# Patient Record
Sex: MALE | Race: BLACK OR AFRICAN AMERICAN | NOT HISPANIC OR LATINO | Employment: FULL TIME | URBAN - METROPOLITAN AREA
[De-identification: names, ages, dates, MRNs, and addresses within clinical notes are randomized per-mention and may not be internally consistent; named-entity substitution may affect disease eponyms.]

---

## 2017-12-01 ENCOUNTER — OFFICE VISIT (OUTPATIENT)
Dept: FAMILY MEDICINE | Facility: CLINIC | Age: 62
End: 2017-12-01

## 2017-12-01 VITALS
WEIGHT: 183.8 LBS | OXYGEN SATURATION: 96 % | HEART RATE: 63 BPM | SYSTOLIC BLOOD PRESSURE: 120 MMHG | HEIGHT: 68 IN | DIASTOLIC BLOOD PRESSURE: 74 MMHG | BODY MASS INDEX: 27.86 KG/M2 | TEMPERATURE: 96.9 F

## 2017-12-01 DIAGNOSIS — R97.20 ELEVATED PROSTATE SPECIFIC ANTIGEN (PSA): ICD-10-CM

## 2017-12-01 DIAGNOSIS — Z00.00 ENCOUNTER FOR ROUTINE ADULT HEALTH EXAMINATION WITHOUT ABNORMAL FINDINGS: Primary | ICD-10-CM

## 2017-12-01 DIAGNOSIS — Z13.6 CARDIOVASCULAR SCREENING; LDL GOAL LESS THAN 160: ICD-10-CM

## 2017-12-01 DIAGNOSIS — Z11.59 NEED FOR HEPATITIS C SCREENING TEST: ICD-10-CM

## 2017-12-01 DIAGNOSIS — H93.13 TINNITUS, BILATERAL: ICD-10-CM

## 2017-12-01 DIAGNOSIS — Z12.5 SCREENING FOR PROSTATE CANCER: ICD-10-CM

## 2017-12-01 DIAGNOSIS — Z12.11 SCREEN FOR COLON CANCER: ICD-10-CM

## 2017-12-01 DIAGNOSIS — I10 ESSENTIAL HYPERTENSION WITH GOAL BLOOD PRESSURE LESS THAN 140/90: ICD-10-CM

## 2017-12-01 PROBLEM — E66.3 OVERWEIGHT (BMI 25.0-29.9): Status: ACTIVE | Noted: 2017-12-01

## 2017-12-01 LAB
CHOLEST SERPL-MCNC: 178 MG/DL
CREAT SERPL-MCNC: 0.95 MG/DL (ref 0.66–1.25)
GFR SERPL CREATININE-BSD FRML MDRD: 80 ML/MIN/1.7M2
HDLC SERPL-MCNC: 46 MG/DL
LDLC SERPL CALC-MCNC: 110 MG/DL
NONHDLC SERPL-MCNC: 132 MG/DL
PSA SERPL-ACNC: 6.99 UG/L (ref 0–4)
TRIGL SERPL-MCNC: 111 MG/DL
TSH SERPL DL<=0.005 MIU/L-ACNC: 0.82 MU/L (ref 0.4–4)

## 2017-12-01 PROCEDURE — 84443 ASSAY THYROID STIM HORMONE: CPT | Performed by: FAMILY MEDICINE

## 2017-12-01 PROCEDURE — 36415 COLL VENOUS BLD VENIPUNCTURE: CPT | Performed by: FAMILY MEDICINE

## 2017-12-01 PROCEDURE — G0103 PSA SCREENING: HCPCS | Performed by: FAMILY MEDICINE

## 2017-12-01 PROCEDURE — 99213 OFFICE O/P EST LOW 20 MIN: CPT | Mod: 25 | Performed by: FAMILY MEDICINE

## 2017-12-01 PROCEDURE — 99386 PREV VISIT NEW AGE 40-64: CPT | Performed by: FAMILY MEDICINE

## 2017-12-01 PROCEDURE — 86803 HEPATITIS C AB TEST: CPT | Performed by: FAMILY MEDICINE

## 2017-12-01 PROCEDURE — 82565 ASSAY OF CREATININE: CPT | Performed by: FAMILY MEDICINE

## 2017-12-01 PROCEDURE — 80061 LIPID PANEL: CPT | Performed by: FAMILY MEDICINE

## 2017-12-01 RX ORDER — AMLODIPINE BESYLATE 5 MG/1
5 TABLET ORAL DAILY
COMMUNITY
Start: 2017-11-28 | End: 2024-03-20

## 2017-12-01 NOTE — PROGRESS NOTES
SUBJECTIVE:   CC: Jose Alejandro Eason is an 62 year old male who presents for preventative health visit.   He is accompanied by his daughter.     He used to doctor in [place]. Daughter reports he has not had a physical in awhile and has been having issues with his blood pressure lately. He saw a provider in [place] 3 days ago for high blood pressure and is only taking amlodipine 5 mg (he was told not to take the irbesartan by this provider).    Healthy Habits:    Do you get at least three servings of calcium containing foods daily (dairy, green leafy vegetables, etc.)? no    Amount of exercise or daily activities, outside of work: None.    Problems taking medications regularly No    Medication side effects: No    Have you had an eye exam in the past two years? yes    Do you see a dentist twice per year? no    Do you have sleep apnea, excessive snoring or daytime drowsiness?excessive snoring        Today's PHQ-2 Score: PHQ-2 ( 1999 [employer]) 12/1/2017   Q1: Little interest or pleasure in doing things 0   Q2: Feeling down, depressed or hopeless 0   PHQ-2 Score 0         Abuse: Current or Past(Physical, Sexual or Emotional)- No  Do you feel safe in your environment - Yes  Social History   Substance Use Topics     Smoking status: Never Smoker     Smokeless tobacco: Never Used     Alcohol use No         Past medical, family, and social histories, medications, and allergies are reviewed and updated in Epic.     ROS:  C: NEGATIVE for fever, chills, change in weight  I: NEGATIVE for worrisome rashes, moles or lesions  E: NEGATIVE for vision changes or irritation  ENT: He notes continuous ringing in his ears. He saw his provider in [place] about this, and he recommended he decrease to 2.5 mg of amlodipine to solve this, but this did not work and his amlodipine dose was increased back to 5 mg. His doctor then recommended that the patient sees a specialist about this.   R: NEGATIVE for significant cough or SOB  CV: NEGATIVE  "for chest pain, palpitations or peripheral edema  GI: NEGATIVE for nausea, abdominal pain, heartburn, or change in bowel habits   male: negative for dysuria, hematuria, decreased urinary stream, erectile dysfunction, urethral discharge  M: NEGATIVE for significant arthralgias or myalgia  N: NEGATIVE for weakness, dizziness or paresthesias  P: NEGATIVE for changes in mood or affect    This document serves as a record of the services and decisions personally performed and made by Dr. Márquez. It was created on his behalf by Kellie Vera, a trained medical scribe. The creation of this document is based the provider's statements to the medical scribe.  Kellie Vera December 1, 2017 8:19 AM   OBJECTIVE:   /87 (BP Location: Left arm, Patient Position: Chair, Cuff Size: Adult Large)  Pulse 63  Temp 96.9  F (36.1  C) (Oral)  Ht 1.721 m (5' 7.75\")  Wt 83.4 kg (183 lb 12.8 oz)  SpO2 96%  BMI 28.15 kg/m2     EXAM:  GENERAL: healthy, alert and no distress  EYES: Eyes grossly normal to inspection, PERRL and conjunctivae and sclerae normal  HENT: ear canals and TM's normal, nose and mouth without ulcers or lesions  NECK: no adenopathy, no asymmetry, masses, or scars and thyroid normal to palpation  RESP: lungs clear to auscultation, no crackles or wheezes, no areas of dullness, no tachypnea   CV: regular rate and rhythm, normal S1 S2, no S3 or S4, no murmur, click or rub, no peripheral edema and peripheral pulses strong  ABDOMEN: soft, nontender, no hepatosplenomegaly, no masses and bowel sounds normal   (male): normal male genitalia without lesions or urethral discharge, no hernia  MS: no gross musculoskeletal defects noted, no edema  SKIN: no suspicious lesions or rashes  NEURO: Normal strength and tone, mentation intact and speech normal, DTRs symmetrical, cranial nerves 2-12 intact   PSYCH: mentation appears normal, affect normal/bright           ASSESSMENT/PLAN:   (Z00.00) Encounter for routine adult " "health examination without abnormal findings  (primary encounter diagnosis)  Comment: Negative screening exam; up-to-date on preventive services.  Plan: Lipid panel reflex to direct LDL Fasting        Follow up in 1 year.    (I10) Essential hypertension with goal blood pressure less than 140/90  Comment: Well controlled.  Plan: TSH with free T4 reflex, Creatinine        Return in about 3 months (around 3/1/2018) for blood pressure check.     (H93.13) Tinnitus, bilateral  Comment: I don't think this is related to his blood pressure, now that his pressure is controlled. Rule out SNHL.   Plan: AUDIOLOGY ADULT REFERRAL, OTOLARYNGOLOGY         REFERRAL            (Z12.5) Screening for prostate cancer  Comment: The patient requests lab work for any/all things that are appropriate for screening.   Plan: Prostate spec antigen screen            (Z11.59) Need for hepatitis C screening test  Comment: indications for screening discussed with the patient   Plan: Hepatitis C Screen Reflex to HCV RNA Quant and         Genotype          (Z13.6) CARDIOVASCULAR SCREENING; LDL GOAL LESS THAN 160  Comment: Fasting.  Plan: Lipid panel reflex to direct LDL Fasting        Monitor periodically.     (Z12.11) Screen for colon cancer  Comment:   Plan: Fecal colorectal cancer screen FIT - Future         (S+30), GASTROENTEROLOGY ADULT REF PROCEDURE         ONLY        Handouts provided.        COUNSELING:  Reviewed preventive health counseling, as reflected in patient instructions  Special attention given to:        Consider Hep C screening for patients born between 1945 and 1965       Colon cancer screening       Prostate cancer screening       reports that he has never smoked. He has never used smokeless tobacco.      Estimated body mass index is 28.15 kg/(m^2) as calculated from the following:    Height as of this encounter: 5' 7.75\" (1.721 m).    Weight as of this encounter: 183 lb 12.8 oz (83.4 kg).   Weight management plan: encouraged " increased aerobic exercise, as outlined in the AVS. He has no set exercise routine outside of work.    Counseling Resources:  ATP IV Guidelines  Pooled Cohorts Equation Calculator  FRAX Risk Assessment  ICSI Preventive Guidelines  Dietary Guidelines for Americans, 2010  USDA's MyPlate  ASA Prophylaxis  Lung CA Screening    The information in this document, created by the medical scribe for me, accurately reflects the services I personally performed and the decisions made by me. I have reviewed and approved this document for accuracy prior to leaving the patient care area. December 1, 2017 8:19 AM    iNmesh Márquez MD  [facility]

## 2017-12-01 NOTE — MR AVS SNAPSHOT
After Visit Summary   12/1/2017    Jose Alejandro Eason    MRN: [id number]           Patient Information     Date Of Birth          1955        Visit Information        Provider Department      12/1/2017 7:40 AM Nimesh Márquez MD [facility]        Today's Diagnoses     Encounter for routine adult health examination without abnormal findings    -  1    Essential hypertension with goal blood pressure less than 140/90        Tinnitus, bilateral        Screening for prostate cancer        Need for hepatitis C screening test        Screen for colon cancer          Care Instructions      Preventive Health Recommendations  Male Ages 50 - 64    Yearly exam:             See your health care provider every year in order to  o   Review health changes.   o   Discuss preventive care.    o   Review your medicines if your doctor has prescribed any.     Have a cholesterol test every 5 years, or more frequently if you are at risk for high cholesterol/heart disease.     Have a diabetes test (fasting glucose) every three years. If you are at risk for diabetes, you should have this test more often.     Have a colonoscopy at age 50, or have a yearly FIT test (stool test). These exams will check for colon cancer.      Talk with your health care provider about whether or not a prostate cancer screening test (PSA) is right for you.    You should be tested each year for STDs (sexually transmitted diseases), if you re at risk.     Shots: Get a flu shot each year. Get a tetanus shot every 10 years.     Nutrition:    Eat at least 5 servings of fruits and vegetables daily.     Eat whole-grain bread, whole-wheat pasta and brown rice instead of white grains and rice.     Talk to your provider about Calcium and Vitamin D.     Lifestyle    Aerobic exercise for at least 150 minutes a week (40+ minutes per day, 4-6 days per week). This will help you control your weight and prevent disease.     Limit alcohol to  one drink per day.     No smoking.     Wear sunscreen to prevent skin cancer.     See your dentist every six months for an exam and cleaning.     See your eye doctor every 1 to 2 years.  At [facility], we strive to deliver an exceptional experience to you, every time we see you.  If you receive a survey in the mail, please send us back your thoughts. We really do value your feedback.    Based on your medical history, these are the current health maintenance/preventive care services that you are due for (some may have been done at this visit.)  Health Maintenance Due   Topic Date Due     TETANUS IMMUNIZATION (SYSTEM ASSIGNED)  10/20/1975     HEPATITIS C SCREENING  10/20/1975     LIPID SCREEN Q5 YR MALE (SYSTEM ASSIGNED)  10/20/1992     COLON CANCER SCREEN (SYSTEM ASSIGNED)  10/20/2007     ADVANCE DIRECTIVE PLANNING Q5 YRS  10/20/2012     INFLUENZA VACCINE (SYSTEM ASSIGNED)  09/01/2017         Suggested websites for health information:  Www.isocket.[facility] : Up to date and easily searchable information on multiple topics.  Www.medlineplus.gov : medication info, interactive tutorials, watch real surgeries online  Www.familydoctor.org : good info from the Academy of Family Physicians  Www.cdc.gov : public health info, travel advisories, epidemics (H1N1)  Www.aap.org : children's health info, normal development, vaccinations  Www.health.[facility].[place].us : [place] dept of health, public health issues in [place], N1N1    Your care team:                            Family Medicine Internal Medicine   MD Ben Phipps MD Shantel Branch-Fleming, MD Katya Georgiev PA-C Nam Ho, MD Pediatrics   NURIS You, MD Joanna Trejo CNP, MD Deborah Mielke, MD Kim Thein, APRN CNP      Clinic hours: Monday - Thursday 7 am-7 pm; Fridays 7 am-5 pm.   Urgent care: Monday - Friday 11 am-9 pm; Saturday and Sunday 9 am-5 pm.  Pharmacy : Monday  -Thursday 8 am-8 pm; Friday 8 am-6 pm; Saturday and Sunday 9 am-5 pm.     Clinic: [phone]   Pharmacy: [phone]      Colorectal Cancer Screening    Colorectal cancer (cancer in the colon or rectum) is a leading cause of cancer deaths in the [place] But it doesn t have to be. When this cancer is found and removed early, the chances of a full recovery are very good. Because colorectal cancer rarely causes symptoms in its early stages, screening for the disease is important. It s even more crucial if you have risk factors for the disease. Learn more about colorectal cancer and its risk factors. Then talk to your healthcare provider about being screened. You could be saving your own life.  Risk factors for colorectal cancer  Your risk of having colorectal cancer increases if you:    Are 50 years of age or older    Have a family history or personal history of colorectal cancer or polyps    Have a personal history of type 2 diabetes, Crohn s disease, or ulcerative colitis    Have an inherited genetic syndrome like Gómez syndrome (also known as HNPCC) or familial adenomatous polyposis (FAP)    Are very overweight    Are not physically active    Smoke    Drink a lot of alcohol    Eat a lot of red or processed meat  The colon and rectum  Waste from food you eat enters the colon from the small intestine. As it travels through the colon, the waste (stool) loses water and becomes more solid. Intestinal muscles push it toward the sigmoid--the last section of the colon. Stool then moves into the rectum, where it s stored until it s ready to leave the body during a bowel movement.  How cancer develops  Polyps are growths that form on the inner lining of the colon or rectum. Most are benign, which means they aren t cancerous. But over time, some polyps can become cancer (malignant). This happens when cells in these polyps begin growing abnormally. In time, malignant cells invade more and more of the colon and rectum. The  cancer may also spread to nearby organs or lymph nodes or to other parts of the body. Finding and removing polyps can help prevent cancer from ever forming.  Your screening  Screening means looking for a health problem before you have symptoms. During screening for colorectal cancer, your healthcare provider will ask about your health history, examine you, and do one or more tests.  History and exam  The history and exam involve the following:    Health history. Your healthcare provider will ask about your health history. Mention if a family member has had colon cancer or polyps. Also mention any health problems you have had in the past.    Digital rectal exam (JOSEPHINE). During a JOSEPHINE, the healthcare provider inserts a lubricated gloved finger into the rectum. The test is painless and takes less than a minute. Healthcare providers agree that this test alone is not enough to screen for colorectal cancer.  Screening test choices  Fecal occult blood test (FOBT) or fecal immunochemical test (FIT)  These tests check for occult blood in stool (blood you can t see). Hidden blood may be a sign of colon polyps or cancer. A small sample of stool is tested for blood in a laboratory. Most often, you collect this sample at home using a kit your healthcare provider gives you. Follow the instructions carefully for using this kit. You might need to avoid certain foods and medicines before the test, as directed.  Barium enema with contrast (double-contrast barium enema)  This test uses X-rays to provide images of the entire colon and rectum. The day before this test, you will need to do a bowel prep to clean out the colon and rectum. A bowel prep is a liquid diet plus strong laxatives or enemas. You will be awake for the test, but you may be given medicine to help you relax. At the start of the test, a radiologist (a healthcare provider who specializes in imaging tests) places a soft tube into the rectum. The tube is used to fill the  colon with a contrast liquid (barium) and air. This can be uncomfortable for some people. The liquid helps the colon show up clearly on the X-rays. Because the test uses X-rays, it exposes you to a small amount of radiation.  Virtual colonoscopy  This exam is also called a CT colonography. It uses a series of X-ray photographs to create a 3-D view of the colon and rectum. The day before the test, you will need to do a bowel prep to clean out your colon. Your healthcare provider will give you instructions on how to do this. During the procedure, you will lie on a table that is part of a special X-ray machine called a CT scanner. A small tube will be placed into your rectum to fill the colon and rectum with air. This can be uncomfortable for some people. Then, the table will move into the machine and pictures will be taken of your colon and rectum. A computer will combine these photos to create a 3-D picture. Because the test uses X-rays, it exposes you to a small amount of radiation.  Scope exams  Here are two types of scope exams:    Colonoscopy. This test can be used to find and remove polyps anywhere in the colon or rectum. The day before the test, you will do a bowel prep. This is a liquid diet plus a strong laxative solution or an enema. The bowel prep will cleanse your colon. You will be given instructions for this. Just before the test, you are given a medicine to make you sleepy. Then, a long, flexible, lighted tube called a colonoscope is gently inserted into the rectum and guided through the entire colon. Images of the colon are viewed on a video screen. Any polyps that are found are removed and sent to a lab for testing. If a polyp can t be removed, a sample of tissue is taken and the polyp might be removed later during surgery. You will need to bring someone with you to drive you home after this test.     Sigmoidoscopy. This test is similar to colonoscopy, but focuses only on the sigmoid colon and rectum.  As with colonoscopy, bowel prep must be done the day before this test. It might not need to be as complete as the bowel prep for a colonoscopy. You are awake during the procedure, but you may be given medicine to help you relax. During the test, the healthcare provider guides a thin, flexible, lighted tube called a sigmoidoscope through your rectum and lower colon. The images are displayed on a video screen. Polyps are removed, if possible, and sent to a lab for testing.  Colonoscopy is the only screening test that lets your healthcare provider see the entire colon and rectum. This test also lets your healthcare provider remove any pieces of tissue that need to be looked at by a lab. If something suspicious is found using any other tests, you will likely need a colonoscopy.     When to call your healthcare provider after a test  Call your healthcare provider if you have any of the following after any screening test:    Bleeding    Fever of 100.4 F (38 C) or higher, or as directed by your healthcare provider    Abdominal pain    Vomiting   Date Last Reviewed: 11/4/2015 2000-2017 [employer]. [address] [place] [address]. All rights reserved. This information is not intended as a substitute for professional medical care. Always follow your healthcare professional's instructions.                  Follow-ups after your visit        Additional Services     AUDIOLOGY ADULT REFERRAL       Your provider has referred you to:   FMG: [facility] [phone]   [email or web address]  FM[facility]: [facility] [phone]   [email or web address]  FM[facility]: [facility] [phone]   [email or web address]  FM[facility]: [facility] [phone]   [email or web address]    Specialty Testing:  Audiogram w/Tymps and Reflexes  (Comprehensive Audiology Evaluation)            GASTROENTEROLOGY ADULT REF PROCEDURE ONLY       Last Lab Result: No results found for: CR  Body mass index is 28.15 kg/(m^2).     Needed:  No  Language:  English    Patient will be contacted to schedule the colonoscopy, or call the Specialty Scheduling Line [phone].     Please be aware that coverage of these services is subject to the terms and limitations of your health insurance plan.  Call member services at your health plan with any benefit or coverage questions.  Any procedures must be performed at a [place] facility OR coordinated by your clinic's referral office.    Please bring the following with you to your appointment:    (1) Any X-Rays, CTs or MRIs which have been performed.  Contact the facility where they were done to arrange for  prior to your scheduled appointment.    (2) List of current medications   (3) This referral request   (4) Any documents/labs given to you for this referral            OTOLARYNGOLOGY REFERRAL       Your provider has referred you to:     [facility] [phone]   [email or web address]  [facility] [phone]   [email or web address]/  [facility] [phone]   [email or web address]/  [facility] [phone]   [email or web address]    Please be aware that coverage of these services is subject to the terms and limitations of your health insurance plan.  Call member services at your health plan with any benefit or coverage questions.      Please bring the following with you to your appointment:    (1) Any X-Rays, CTs or MRIs which have been performed.  Contact the facility where they were done to arrange for  prior to your scheduled appointment.   (2) List of current medications  (3) This referral request   (4)  "Any documents/labs given to you for this referral                  Follow-up notes from your care team     Return in about 3 months (around 3/1/2018) for blood pressure check.      Future tests that were ordered for you today     Open Future Orders        Priority Expected Expires Ordered    Fecal colorectal cancer screen FIT - Future (S+30) Routine 2017            Who to contact     If you have questions or need follow up information about today's clinic visit or your schedule please contact [facility] directly at [phone].  Normal or non-critical lab and imaging results will be communicated to you by [employer], letter or phone within 4 business days after the clinic has received the results. If you do not hear from us within 7 days, please contact the clinic through Zi[employer] or phone. If you have a critical or abnormal lab result, we will notify you by phone as soon as possible.  Submit refill requests through [employer] or call your pharmacy and they will forward the refill request to us. Please allow 3 business days for your refill to be completed.          Additional Information About Your Visit        MyChart Information     [employer] lets you send messages to your doctor, view your test results, renew your prescriptions, schedule appointments and more. To sign up, go to [email or web address] . Click on \"Log in\" on the left side of the screen, which will take you to the Welcome page. Then click on \"Sign up Now\" on the right side of the page.     You will be asked to enter the access code listed below, as well as some personal information. Please follow the directions to create your username and password.     Your access code is: [email or web address]  Expires: 3/1/2018  8:52 AM     Your access code will  in 90 days. If you need help or a new code, please call your [facility] or [phone].        Care EveryWhere ID     This is your Care EveryWhere ID. This could " "be used by other organizations to access your [place] medical records  [id number]        Your Vitals Were     Pulse Temperature Height Pulse Oximetry BMI (Body Mass Index)       63 96.9  F (36.1  C) (Oral) 1.721 m (5' 7.75\") 96% 28.15 kg/m2        Blood Pressure from Last 3 Encounters:   12/01/17 120/74    Weight from Last 3 Encounters:   12/01/17 83.4 kg (183 lb 12.8 oz)              We Performed the Following     AUDIOLOGY ADULT REFERRAL     Creatinine     GASTROENTEROLOGY ADULT REF PROCEDURE ONLY     Hepatitis C Screen Reflex to HCV RNA Quant and Genotype     Lipid panel reflex to direct LDL Fasting     OTOLARYNGOLOGY REFERRAL     Prostate spec antigen screen     TSH with free T4 reflex        Primary Care Provider    None Specified       No primary provider on file.        Equal Access to Services     LAURA NOONAN : Jenni mcleano Soroni, waaxda luqadaha, qaybta kaalmada ademarsyabrigette, genny ochoa . So [facility] [phone].    ATENCIÓN: Si habla español, tiene a valadez disposición servicios gratuitos de asistencia lingüística. Llame al [phone].    We comply with applicable federal civil rights laws and [place] laws. We do not discriminate on the basis of race, color, national origin, age, disability, sex, sexual orientation, or gender identity.            Thank you!     Thank you for choosing [facility]  for your care. Our goal is always to provide you with excellent care. Hearing back from our patients is one way we can continue to improve our services. Please take a few minutes to complete the written survey that you may receive in the mail after your visit with us. Thank you!             Your Updated Medication List - Protect others around you: Learn how to safely use, store and throw away your medicines at www.disposemymeds.org.          This list is accurate as of: 12/1/17  8:52 AM.  Always use your most recent med list.                   Brand Name " Dispense Instructions for use Diagnosis    amLODIPine 5 MG tablet    NORVASC     Take 5 mg by mouth

## 2017-12-01 NOTE — PATIENT INSTRUCTIONS
Preventive Health Recommendations  Male Ages 50   64    Yearly exam:             See your health care provider every year in order to  o   Review health changes.   o   Discuss preventive care.    o   Review your medicines if your doctor has prescribed any.     Have a cholesterol test every 5 years, or more frequently if you are at risk for high cholesterol/heart disease.     Have a diabetes test (fasting glucose) every three years. If you are at risk for diabetes, you should have this test more often.     Have a colonoscopy at age 50, or have a yearly FIT test (stool test). These exams will check for colon cancer.      Talk with your health care provider about whether or not a prostate cancer screening test (PSA) is right for you.    You should be tested each year for STDs (sexually transmitted diseases), if you re at risk.     Shots: Get a flu shot each year. Get a tetanus shot every 10 years.     Nutrition:    Eat at least 5 servings of fruits and vegetables daily.     Eat whole-grain bread, whole-wheat pasta and brown rice instead of white grains and rice.     Talk to your provider about Calcium and Vitamin D.     Lifestyle    Aerobic exercise for at least 150 minutes a week (40+ minutes per day, 4-6 days per week). This will help you control your weight and prevent disease.     Limit alcohol to one drink per day.     No smoking.     Wear sunscreen to prevent skin cancer.     See your dentist every six months for an exam and cleaning.     See your eye doctor every 1 to 2 years.  At [facility], we strive to deliver an exceptional experience to you, every time we see you.  If you receive a survey in the mail, please send us back your thoughts. We really do value your feedback.    Based on your medical history, these are the current health maintenance/preventive care services that you are due for (some may have been done at this visit.)  Health Maintenance Due   Topic Date Due     TETANUS  IMMUNIZATION (SYSTEM ASSIGNED)  10/20/1975     HEPATITIS C SCREENING  10/20/1975     LIPID SCREEN Q5 YR MALE (SYSTEM ASSIGNED)  10/20/1992     COLON CANCER SCREEN (SYSTEM ASSIGNED)  10/20/2007     ADVANCE DIRECTIVE PLANNING Q5 YRS  10/20/2012     INFLUENZA VACCINE (SYSTEM ASSIGNED)  09/01/2017         Suggested websites for health information:  Www.Crovat.org : Up to date and easily searchable information on multiple topics.  Www.medlineplus.gov : medication info, interactive tutorials, watch real surgeries online  Www.familydoctor.org : good info from the Academy of Family Physicians  Www.cdc.gov : public health info, travel advisories, epidemics (H1N1)  Www.aap.org : children's health info, normal development, vaccinations  Www.health.[facility].[place].us : [place] dept of health, public health issues in [place], N1N1    Your care team:                            Family Medicine Internal Medicine   MD Ben Phipps MD Shantel Branch-Fleming, MD Katya Georgiev PA-C Nam Ho, MD Pediatrics   NURIS You, ALEKSANDAR Bryant APRN CNP   MD Joanna Baird MD Deborah Mielke, MD Kim Thein, APRN [facility]      Clinic hours: Monday - Thursday 7 am-7 pm; Fridays 7 am-5 pm.   Urgent care: Monday - Friday 11 am-9 pm; Saturday and Sunday 9 am-5 pm.  Pharmacy : Monday -Thursday 8 am-8 pm; Friday 8 am-6 pm; Saturday and Sunday 9 am-5 pm.     Clinic: [phone]   Pharmacy: [phone]      Colorectal Cancer Screening    Colorectal cancer (cancer in the colon or rectum) is a leading cause of cancer deaths in the [place] But it doesn t have to be. When this cancer is found and removed early, the chances of a full recovery are very good. Because colorectal cancer rarely causes symptoms in its early stages, screening for the disease is important. It s even more crucial if you have risk factors for the disease. Learn more about colorectal cancer and its risk factors. Then talk to your  healthcare provider about being screened. You could be saving your own life.  Risk factors for colorectal cancer  Your risk of having colorectal cancer increases if you:    Are 50 years of age or older    Have a family history or personal history of colorectal cancer or polyps    Have a personal history of type 2 diabetes, Crohn s disease, or ulcerative colitis    Have an inherited genetic syndrome like Gómez syndrome (also known as HNPCC) or familial adenomatous polyposis (FAP)    Are very overweight    Are not physically active    Smoke    Drink a lot of alcohol    Eat a lot of red or processed meat  The colon and rectum  Waste from food you eat enters the colon from the small intestine. As it travels through the colon, the waste (stool) loses water and becomes more solid. Intestinal muscles push it toward the sigmoid the last section of the colon. Stool then moves into the rectum, where it s stored until it s ready to leave the body during a bowel movement.  How cancer develops  Polyps are growths that form on the inner lining of the colon or rectum. Most are benign, which means they aren t cancerous. But over time, some polyps can become cancer (malignant). This happens when cells in these polyps begin growing abnormally. In time, malignant cells invade more and more of the colon and rectum. The cancer may also spread to nearby organs or lymph nodes or to other parts of the body. Finding and removing polyps can help prevent cancer from ever forming.  Your screening  Screening means looking for a health problem before you have symptoms. During screening for colorectal cancer, your healthcare provider will ask about your health history, examine you, and do one or more tests.  History and exam  The history and exam involve the following:    Health history. Your healthcare provider will ask about your health history. Mention if a family member has had colon cancer or polyps. Also mention any health problems you have  had in the past.    Digital rectal exam (JOSEPHINE). During a JOSEPHINE, the healthcare provider inserts a lubricated gloved finger into the rectum. The test is painless and takes less than a minute. Healthcare providers agree that this test alone is not enough to screen for colorectal cancer.  Screening test choices  Fecal occult blood test (FOBT) or fecal immunochemical test (FIT)  These tests check for occult blood in stool (blood you can t see). Hidden blood may be a sign of colon polyps or cancer. A small sample of stool is tested for blood in a laboratory. Most often, you collect this sample at home using a kit your healthcare provider gives you. Follow the instructions carefully for using this kit. You might need to avoid certain foods and medicines before the test, as directed.  Barium enema with contrast (double-contrast barium enema)  This test uses X-rays to provide images of the entire colon and rectum. The day before this test, you will need to do a bowel prep to clean out the colon and rectum. A bowel prep is a liquid diet plus strong laxatives or enemas. You will be awake for the test, but you may be given medicine to help you relax. At the start of the test, a radiologist (a healthcare provider who specializes in imaging tests) places a soft tube into the rectum. The tube is used to fill the colon with a contrast liquid (barium) and air. This can be uncomfortable for some people. The liquid helps the colon show up clearly on the X-rays. Because the test uses X-rays, it exposes you to a small amount of radiation.  Virtual colonoscopy  This exam is also called a CT colonography. It uses a series of X-ray photographs to create a 3-D view of the colon and rectum. The day before the test, you will need to do a bowel prep to clean out your colon. Your healthcare provider will give you instructions on how to do this. During the procedure, you will lie on a table that is part of a special X-ray machine called a CT  scanner. A small tube will be placed into your rectum to fill the colon and rectum with air. This can be uncomfortable for some people. Then, the table will move into the machine and pictures will be taken of your colon and rectum. A computer will combine these photos to create a 3-D picture. Because the test uses X-rays, it exposes you to a small amount of radiation.  Scope exams  Here are two types of scope exams:    Colonoscopy. This test can be used to find and remove polyps anywhere in the colon or rectum. The day before the test, you will do a bowel prep. This is a liquid diet plus a strong laxative solution or an enema. The bowel prep will cleanse your colon. You will be given instructions for this. Just before the test, you are given a medicine to make you sleepy. Then, a long, flexible, lighted tube called a colonoscope is gently inserted into the rectum and guided through the entire colon. Images of the colon are viewed on a video screen. Any polyps that are found are removed and sent to a lab for testing. If a polyp can t be removed, a sample of tissue is taken and the polyp might be removed later during surgery. You will need to bring someone with you to drive you home after this test.     Sigmoidoscopy. This test is similar to colonoscopy, but focuses only on the sigmoid colon and rectum. As with colonoscopy, bowel prep must be done the day before this test. It might not need to be as complete as the bowel prep for a colonoscopy. You are awake during the procedure, but you may be given medicine to help you relax. During the test, the healthcare provider guides a thin, flexible, lighted tube called a sigmoidoscope through your rectum and lower colon. The images are displayed on a video screen. Polyps are removed, if possible, and sent to a lab for testing.  Colonoscopy is the only screening test that lets your healthcare provider see the entire colon and rectum. This test also lets your healthcare  provider remove any pieces of tissue that need to be looked at by a lab. If something suspicious is found using any other tests, you will likely need a colonoscopy.     When to call your healthcare provider after a test  Call your healthcare provider if you have any of the following after any screening test:    Bleeding    Fever of 100.4 F (38 C) or higher, or as directed by your healthcare provider    Abdominal pain    Vomiting   Date Last Reviewed: 11/4/2015 2000-2017 [employer]. [address] [place], [place] [address]. All rights reserved. This information is not intended as a substitute for professional medical care. Always follow your healthcare professional's instructions.

## 2017-12-01 NOTE — NURSING NOTE
"Chief Complaint   Patient presents with     Physical       Initial /87 (BP Location: Left arm, Patient Position: Chair, Cuff Size: Adult Large)  Pulse 63  Temp 96.9  F (36.1  C) (Oral)  Ht 5' 7.75\" (1.721 m)  Wt 183 lb 12.8 oz (83.4 kg)  SpO2 96%  BMI 28.15 kg/m2 Estimated body mass index is 28.15 kg/(m^2) as calculated from the following:    Height as of this encounter: 5' 7.75\" (1.721 m).    Weight as of this encounter: 183 lb 12.8 oz (83.4 kg).  Medication Reconciliation: complete     Emily Strauss MA      "

## 2017-12-01 NOTE — LETTER
December 5, 2017      Jose Alejandro Eason  [address] [facility] [address]        Dear ,    We are writing to inform you of your test results.      Your PSA was elevated.  This is often caused by some low level inflammation, or age-related benign enlargement of the prostate, but in some cases, this can be an indication of prostate cancer.  The most appropriate approach is to have you meet with a urologist to discuss further testing or treatment.  Please call the one of the numbers below to schedule an appointment to see a Urologist.       All of the rest of your test results were within the expected range for you.     Please contact the clinic if you have additional questions.  Thank you.     Sincerely,       Nimesh Márquez MD         FMG: [facility] [phone]   [email or web address]   FMG: [facility] [phone]   [email or web address]   FMG: [facility] [phone]   [email or web address]   FMG: [facility] [phone]   [email or web address]     Please be aware that coverage of these services is subject to the terms and limitations of your health insurance plan.  Call member services at your health plan with any benefit or coverage questions.     Resulted Orders   Hepatitis C Screen Reflex to HCV RNA Quant and Genotype   Result Value Ref Range    Hepatitis C Antibody Nonreactive NR^Nonreactive      Comment:      Assay performance characteristics have not been established for newborns,   infants, and children     Lipid panel reflex to direct LDL Fasting   Result Value Ref Range    Cholesterol 178 <200 mg/dL    Triglycerides 111 <150 mg/dL      Comment:       Fasting specimen    HDL Cholesterol 46 >39 mg/dL    LDL Cholesterol Calculated 110 (H) <100 mg/dL      Comment:      Above desirable:  100-129 mg/dl  Borderline High:  130-159 mg/dL  High:             160-189 mg/dL  Very high:       >189 mg/dl      Non HDL Cholesterol 132 (H) <130 mg/dL      Comment:      Above Desirable:  130-159 mg/dl  Borderline high:  160-189 mg/dl  High:             190-219 mg/dl  Very high:       >219 mg/dl     TSH with free T4 reflex   Result Value Ref Range    TSH 0.82 0.40 - 4.00 mU/L   Creatinine   Result Value Ref Range    Creatinine 0.95 0.66 - 1.25 mg/dL    GFR Estimate 80 >60 mL/min/1.7m2      Comment:      Non  GFR Calc    GFR Estimate If Black >90 >60 mL/min/1.7m2      Comment:       GFR Calc   Prostate spec antigen screen   Result Value Ref Range    PSA 6.99 (H) 0 - 4 ug/L      Comment:      Assay Method:  Chemiluminescence using Siemens Vista analyzer       If you have any questions or concerns, please call the clinic at the number listed above.       Sincerely,        Nimesh Márquez MD

## 2017-12-03 PROCEDURE — 82274 ASSAY TEST FOR BLOOD FECAL: CPT | Performed by: FAMILY MEDICINE

## 2017-12-04 LAB — HCV AB SERPL QL IA: NONREACTIVE

## 2017-12-05 DIAGNOSIS — Z12.11 SCREEN FOR COLON CANCER: ICD-10-CM

## 2017-12-05 NOTE — PROGRESS NOTES
Letter sent to patients home address with results.  Jorge Luis Strange,  [employer] [facility]

## 2017-12-06 LAB — HEMOCCULT STL QL IA: NEGATIVE

## 2018-02-23 ENCOUNTER — OFFICE VISIT (OUTPATIENT)
Dept: UROLOGY | Facility: CLINIC | Age: 63
End: 2018-02-23
Payer: COMMERCIAL

## 2018-02-23 VITALS
DIASTOLIC BLOOD PRESSURE: 93 MMHG | SYSTOLIC BLOOD PRESSURE: 130 MMHG | RESPIRATION RATE: 16 BRPM | OXYGEN SATURATION: 96 % | HEART RATE: 67 BPM

## 2018-02-23 DIAGNOSIS — R97.20 ELEVATED PROSTATE SPECIFIC ANTIGEN (PSA): Primary | ICD-10-CM

## 2018-02-23 DIAGNOSIS — I10 ESSENTIAL HYPERTENSION WITH GOAL BLOOD PRESSURE LESS THAN 140/90: ICD-10-CM

## 2018-02-23 PROCEDURE — 87077 CULTURE AEROBIC IDENTIFY: CPT | Performed by: UROLOGY

## 2018-02-23 PROCEDURE — 87070 CULTURE OTHR SPECIMN AEROBIC: CPT | Performed by: UROLOGY

## 2018-02-23 PROCEDURE — 99243 OFF/OP CNSLTJ NEW/EST LOW 30: CPT | Performed by: UROLOGY

## 2018-02-23 PROCEDURE — 87186 SC STD MICRODIL/AGAR DIL: CPT | Mod: 59 | Performed by: UROLOGY

## 2018-02-23 NOTE — PROGRESS NOTES
S: Jose Alejandro Eason is a pleasant  62 year old male who was requested to be seen by  Dr. Márquez for a consult with regard to patient's elevated PSA.  His recent PSA was found to be   PSA   Date Value Ref Range Status   12/01/2017 6.99 (H) 0 - 4 ug/L Final     Comment:     Assay Method:  Chemiluminescence using Siemens Vista analyzer   .  His previous PSA was never done previously.  Patient complains of no urinary symptoms.  He has no history of elevated PSA.  His AUA Symptom Score:  low.  Current Outpatient Prescriptions   Medication Sig Dispense Refill     amLODIPine (NORVASC) 5 MG tablet Take 5 mg by mouth        Allergies   Allergen Reactions     Sulfa Drugs       Past Medical History:   Diagnosis Date     Essential hypertension with goal blood pressure less than 140/90 2011     Past Surgical History:   Procedure Laterality Date     NO HISTORY OF SURGERY        Family History   Problem Relation Age of Onset     Ovarian Cancer Sister 70     DIABETES Daughter      type 1      Hypertension No family hx of      Coronary Artery Disease No family hx of      CEREBROVASCULAR DISEASE No family hx of      He does not have a family history of prostate cancer.  Social History     Social History     Marital status: Single     Spouse name:      Number of children: 5     Years of education: N/A     Occupational History     CNA      Social History Main Topics     Smoking status: Never Smoker     Smokeless tobacco: Never Used     Alcohol use No      Comment: stopped drinking in 2010     Drug use: No     Sexual activity: Yes     Partners: Female     Other Topics Concern     Not on file     Social History Narrative        REVIEW OF SYSTEMS  =================  C: NEGATIVE for fever, chills, change in weight  I: NEGATIVE for worrisome rashes, moles or lesions  E/M: NEGATIVE for ear, mouth and throat problems  R: NEGATIVE for significant cough or SHORTNESS OF BREATH  CV:  NEGATIVE for chest pain, palpitations or peripheral  edema  GI: NEGATIVE for nausea, abdominal pain, heartburn, or change in bowel habits  NEURO: NEGATIVE  PSYCH: NEGATIVE    Physical Exam:  BP (!) 130/93 (BP Location: Left arm, Patient Position: Chair, Cuff Size: Adult Large)  Pulse 67  Resp 16  SpO2 96%   Patient is pleasant, in no acute distress, good general condition.  HEENT normocephalic, atraumatic  Lung: no evidence of respiratory distress    Abdomen: Soft, nondistended, non tender. No masses. No rebound or guarding.   Exam: penis no d/c. Testis no masses.  No scrotal skin lesion.  Prostate 30 gm smooth no nodule.  Skin: Warm and dry.  No redness.  Neuro non focal  Psych normal mood and affect    Assessment/Plan:   (R97.20) Elevated prostate specific antigen (PSA)  (primary encounter diagnosis)  Comment: discussed psa elevation /prostate cancer  Plan: schedule for trus and biopsy            Risks of bleeding/infection discussed    (I10) Essential hypertension with goal blood pressure less than 140/90  Comment:    Plan:  Patient to follow up with Primary Care provider regarding elevated blood pressure.

## 2018-02-23 NOTE — MR AVS SNAPSHOT
After Visit Summary   2/23/2018    Jose Alejandro Eason    MRN: [id number]           Patient Information     Date Of Birth          1955        Visit Information        Provider Department      2/23/2018 10:15 AM Joey Galindo MD [place] [facility]        Today's Diagnoses     Elevated prostate specific antigen (PSA)    -  1    Essential hypertension with goal blood pressure less than 140/90           Follow-ups after your visit        Your next 10 appointments already scheduled     Mar 23, 2018 11:00 AM CDT   (Arrive by 10:45 AM)   US PROSTATE WITH BIOPSY with FKUS1   [facility] ([facility])    [address] [place] [id number]   [phone]           Please bring a list of your medicines (including vitamins, minerals and over-the-counter drugs). Also, tell your doctor about any allergies you may have. Wear comfortable clothes and leave your valuables at home.  Take a Fleet enema two hours before your exam. Buy this at the drug store. Follow the instructions on the box.  Please bring or send the results of your most recent PSA test, along with the written report from your last rectal or prostate exam.  Please call the Imaging Department at your exam site with any questions.            Mar 23, 2018 11:15 AM CDT   Return Visit with Joey Galindo MD   [place] [facility] ([facility])    [address] [place] [id number]   [phone]            Mar 30, 2018 11:30 AM CDT   Return Visit with Jeoy Galindo MD   [facility]    [address] [place] [id number]   [phone]              Future tests that were ordered for you today     Open Future Orders        Priority Expected Expires Ordered    US Guided Needle Placement Routine 2/23/2018 2/23/2019 2/23/2018            Who to contact     If you have questions or need follow up information about today's clinic  visit or your schedule please contact [facility] directly at [phone].  Normal or non-critical lab and imaging results will be communicated to you by MyChart, letter or phone within 4 business days after the clinic has received the results. If you do not hear from us within 7 days, please contact the clinic through [employer] or phone. If you have a critical or abnormal lab result, we will notify you by phone as soon as possible.  Submit refill requests through [employer] or call your pharmacy and they will forward the refill request to us. Please allow 3 business days for your refill to be completed.          Additional Information About Your Visit        [employer] Information     [employer] gives you secure access to your electronic health record. If you see a primary care provider, you can also send messages to your care team and make appointments. If you have questions, please call your primary care clinic.  If you do not have a primary care provider, please call [phone] and they will assist you.        Care EveryWhere ID     This is your Care EveryWhere ID. This could be used by other organizations to access your [place] medical records  [id number]        Your Vitals Were     Pulse Respirations Pulse Oximetry             67 16 96%          Blood Pressure from Last 3 Encounters:   02/23/18 (!) 130/93   12/01/17 120/74    Weight from Last 3 Encounters:   12/01/17 183 lb 12.8 oz (83.4 kg)              We Performed the Following     Perirectal Culture Aerobic Bacterial        Primary Care Provider Fax #    Provider Not In System [phone]                Equal Access to Services     LAURA NOONAN : Hadii lisa casanova Soroni, waaxda lubebeto, qaybta kaalmagenny collado . So [facility] [phone].    ATENCIÓN: Si habla español, tiene a valadez disposición servicios gratuitos de asistencia lingüística. Llame al [phone].    We comply with applicable federal civil  rights laws and [place] laws. We do not discriminate on the basis of race, color, national origin, age, disability, sex, sexual orientation, or gender identity.            Thank you!     Thank you for choosing [facility]  for your care. Our goal is always to provide you with excellent care. Hearing back from our patients is one way we can continue to improve our services. Please take a few minutes to complete the written survey that you may receive in the mail after your visit with us. Thank you!             Your Updated Medication List - Protect others around you: Learn how to safely use, store and throw away your medicines at www.disposemymeds.org.          This list is accurate as of 2/23/18 10:19 AM.  Always use your most recent med list.                   Brand Name Dispense Instructions for use Diagnosis    amLODIPine 5 MG tablet    NORVASC     Take 5 mg by mouth

## 2018-02-26 DIAGNOSIS — R97.20 ELEVATED PROSTATE SPECIFIC ANTIGEN (PSA): Primary | ICD-10-CM

## 2018-02-26 RX ORDER — CEPHALEXIN 500 MG/1
500 CAPSULE ORAL 3 TIMES DAILY
Qty: 9 CAPSULE | Refills: 0 | Status: SHIPPED | OUTPATIENT
Start: 2018-02-26 | End: 2018-03-01

## 2018-02-26 RX ORDER — CIPROFLOXACIN 500 MG/1
500 TABLET, FILM COATED ORAL 2 TIMES DAILY
Qty: 6 TABLET | Refills: 0 | Status: SHIPPED | OUTPATIENT
Start: 2018-02-26 | End: 2018-03-01

## 2018-02-27 LAB
BACTERIA SPEC CULT: ABNORMAL
Lab: ABNORMAL
SPECIMEN SOURCE: ABNORMAL

## 2018-03-23 ENCOUNTER — OFFICE VISIT (OUTPATIENT)
Dept: UROLOGY | Facility: CLINIC | Age: 63
End: 2018-03-23
Payer: COMMERCIAL

## 2018-03-23 ENCOUNTER — RADIANT APPOINTMENT (OUTPATIENT)
Dept: ULTRASOUND IMAGING | Facility: CLINIC | Age: 63
End: 2018-03-23
Payer: COMMERCIAL

## 2018-03-23 DIAGNOSIS — R97.20 ELEVATED PROSTATE SPECIFIC ANTIGEN (PSA): ICD-10-CM

## 2018-03-23 DIAGNOSIS — R97.20 ELEVATED PROSTATE SPECIFIC ANTIGEN (PSA): Primary | ICD-10-CM

## 2018-03-23 PROCEDURE — 88344 IMHCHEM/IMCYTCHM EA MLT ANTB: CPT | Performed by: UROLOGY

## 2018-03-23 PROCEDURE — 88305 TISSUE EXAM BY PATHOLOGIST: CPT | Performed by: UROLOGY

## 2018-03-23 PROCEDURE — 55700 ZZHC BIOPSY PROSTATE NEEDLE/PUNCH: CPT | Performed by: UROLOGY

## 2018-03-23 PROCEDURE — 76942 ECHO GUIDE FOR BIOPSY: CPT | Performed by: UROLOGY

## 2018-03-23 PROCEDURE — 76872 US TRANSRECTAL: CPT | Performed by: UROLOGY

## 2018-03-23 PROCEDURE — 99000 SPECIMEN HANDLING OFFICE-LAB: CPT | Performed by: UROLOGY

## 2018-03-23 NOTE — MR AVS SNAPSHOT
After Visit Summary   3/23/2018    Jose Alejandro Eason    MRN: [id number]           Patient Information     Date Of Birth          1955        Visit Information        Provider Department      3/23/2018 11:15 AM Joey Galindo MD [facility]        Today's Diagnoses     Elevated prostate specific antigen (PSA)    -  1       Follow-ups after your visit        Your next 10 appointments already scheduled     Mar 30, 2018 11:30 AM CDT   Return Visit with Joey Galindo MD   [facility] [address] [phone]              Who to contact     If you have questions or need follow up information about today's clinic visit or your schedule please contact [facility] directly at [phone].  Normal or non-critical lab and imaging results will be communicated to you by MyChart, letter or phone within 4 business days after the clinic has received the results. If you do not hear from us within 7 days, please contact the clinic through MyChart or phone. If you have a critical or abnormal lab result, we will notify you by phone as soon as possible.  Submit refill requests through epicurio or call your pharmacy and they will forward the refill request to us. Please allow 3 business days for your refill to be completed.          Additional Information About Your Visit        MyChart Information     epicurio gives you secure access to your electronic health record. If you see a primary care provider, you can also send messages to your care team and make appointments. If you have questions, please call your primary care clinic.  If you do not have a primary care provider, please call [phone] and they will assist you.        Care EveryWhere ID     This is your Care EveryWhere ID. This could be used by other organizations to access your [place] medical records  [id number]         Blood Pressure from  Last 3 Encounters:   02/23/18 (!) 130/93   12/01/17 120/74    Weight from Last 3 Encounters:   12/01/17 83.4 kg (183 lb 12.8 oz)              We Performed the Following     BIOPSY PROSTATE NEEDLE/PUNCH     C HANDLING LAB/BLOOD COLLECTION     Surgical pathology exam        Primary Care Provider Fax #    Provider Not In System [phone]                Equal Access to Services     [facility]: Hadii aad ku hadasho Soomaali, waaxda luqadaha, qaybta kaalmada adeegyada, genny arias rojelion sarabjit nelsonkarthikdixie ochoa . So [facility] [phone].    ATENCIÓN: Si habla español, tiene a valadez disposición servicios gratuitos de asistencia lingüística. Llame al [phone].    We comply with applicable federal civil rights laws and [place] laws. We do not discriminate on the basis of race, color, national origin, age, disability, sex, sexual orientation, or gender identity.            Thank you!     [facility] you [facility]  for your [facility]. Our goal is always to provide you with excellent care. Hearing back from our patients is one way we can continue to improve our services. Please take a few minutes to complete the written survey that you may receive in the mail after your visit with us. Thank you!             Your Updated Medication List - Protect others around you: Learn how to safely use, store and throw away your medicines at www.disposemymeds.org.          This list is accurate as of 3/23/18 11:31 AM.  Always use your most recent med list.                   Brand Name Dispense Instructions for use Diagnosis    amLODIPine 5 MG tablet    NORVASC     Take 5 mg by mouth

## 2018-03-23 NOTE — PROGRESS NOTES
Patient is here for prostate biopsy.  He was placed in the dorsal lithotomy position.  Prostate ultrasound placed transrectally.  The neurovascular bundle was anesthetized using 1% lidocaine.  Prostate measurements obtained.  Prostate size is 26 cc.  12 biopsies obtained under guidance of prostate ultrasound using biopsy needle.  Patient tolerated procedure.  Return to clinic in one week for biopsy result.

## 2018-03-26 ENCOUNTER — NURSE TRIAGE (OUTPATIENT)
Dept: NURSING | Facility: CLINIC | Age: 63
End: 2018-03-26

## 2018-03-27 LAB — COPATH REPORT: NORMAL

## 2018-03-27 NOTE — TELEPHONE ENCOUNTER
Caller has  gross hematuria tonight; toilet full of dark red liquid;   Had needle biopsy of prostate  3 days ago and  Bleeding had been minimal; no anticoag therapy  Triage protocol reviewed  Advised to proceed to ED [facility] tonight  Understands and will comply  Arabella Danielle RN  FNA    Reason for Disposition    Passing pure blood or large blood clots (i.e., size > a dime) (Exception: ajay or small strands)    Additional Information    Negative: Shock suspected (e.g., cold/pale/clammy skin, too weak to stand, low BP, rapid pulse)    Negative: Sounds like a life-threatening emergency to the triager    Negative: Urinary catheter, questions about    Protocols used: URINE - BLOOD IN-ADULT-

## 2018-03-30 ENCOUNTER — OFFICE VISIT (OUTPATIENT)
Dept: UROLOGY | Facility: CLINIC | Age: 63
End: 2018-03-30
Payer: COMMERCIAL

## 2018-03-30 DIAGNOSIS — C61 MALIGNANT NEOPLASM OF PROSTATE (H): Primary | ICD-10-CM

## 2018-03-30 PROCEDURE — 99213 OFFICE O/P EST LOW 20 MIN: CPT | Performed by: UROLOGY

## 2018-03-30 NOTE — MR AVS SNAPSHOT
After Visit Summary   3/30/2018    Jose Alejandro Eason    MRN: [id number]           Patient Information     Date Of Birth          1955        Visit Information        Provider Department      3/30/2018 11:30 AM Joey Galindo MD [facility]        Today's Diagnoses     Malignant neoplasm of prostate (H)    -  1       Follow-ups after your visit        Who to contact     If you have questions or need follow up information about today's clinic visit or your schedule please contact [facility] directly at [phone].  Normal or non-critical lab and imaging results will be communicated to you by TriQ Systemshart, letter or phone within 4 business days after the clinic has received the results. If you do not hear from us within 7 days, please contact the clinic through [employer] or phone. If you have a critical or abnormal lab result, we will notify you by phone as soon as possible.  Submit refill requests through MyDentist or call your pharmacy and they will forward the refill request to us. Please allow 3 business days for your refill to be completed.          Additional Information About Your Visit        MyChart Information     MyDentist gives you secure access to your electronic health record. If you see a primary care provider, you can also send messages to your care team and make appointments. If you have questions, please call your primary care clinic.  If you do not have a primary care provider, please call [phone] and they will assist you.        Care EveryWhere ID     This is your Care EveryWhere ID. This could be used by other organizations to access your [place] medical records  [id number]         Blood Pressure from Last 3 Encounters:   02/23/18 (!) 130/93   12/01/17 120/74    Weight from Last 3 Encounters:   12/01/17 83.4 kg (183 lb 12.8 oz)              Today, you had the following     No orders found for display       Primary Care Provider Fax #    Provider Not  In System [phone]                Equal Access to Services     CINDIMACR SARAN : Hadii aad ku hadxiomaraashley Osorio, wajosefbrigette ogden, luz elenadavid jeongmagenny collado. So [facility] [phone].    ATENCIÓN: Si habla español, tiene a valadez disposición servicios gratuitos de asistencia lingüística. Llame al [phone].    We comply with applicable federal civil rights laws and [place] laws. We do not discriminate on the basis of race, color, national origin, age, disability, sex, sexual orientation, or gender identity.            Thank you!     Thank you for choosing [facility]  for your care. Our goal is always to provide you with excellent care. Hearing back from our patients is one way we can continue to improve our services. Please take a few minutes to complete the written survey that you may receive in the mail after your visit with us. Thank you!             Your Updated Medication List - Protect others around you: Learn how to safely use, store and throw away your medicines at www.disposemymeds.org.          This list is accurate as of 3/30/18 11:46 AM.  Always use your most recent med list.                   Brand Name Dispense Instructions for use Diagnosis    amLODIPine 5 MG tablet    NORVASC     Take 5 mg by mouth

## 2018-03-30 NOTE — PROGRESS NOTES
Chief Complaint   Patient presents with     Results       Jose Alejandro Eason is a 62 year old male who presents today for follow up of   Chief Complaint   Patient presents with     Results    f/u after recent biopsy of prostate for elevated psa.  He is without any complaints.    Current Outpatient Prescriptions   Medication Sig Dispense Refill     amLODIPine (NORVASC) 5 MG tablet Take 5 mg by mouth       Allergies   Allergen Reactions     Sulfa Drugs       Past Medical History:   Diagnosis Date     Essential hypertension with goal blood pressure less than 140/90 2011     Past Surgical History:   Procedure Laterality Date     NO HISTORY OF SURGERY       Family History   Problem Relation Age of Onset     Ovarian Cancer Sister 70     DIABETES Daughter      type 1      Hypertension No family hx of      Coronary Artery Disease No family hx of      CEREBROVASCULAR DISEASE No family hx of      Social History     Social History     Marital status: Single     Spouse name:      Number of children: 5     Years of education: N/A     Occupational History     CNA      Social History Main Topics     Smoking status: Never Smoker     Smokeless tobacco: Never Used     Alcohol use No      Comment: stopped drinking in 2010     Drug use: No     Sexual activity: Yes     Partners: Female     Other Topics Concern     None     Social History Narrative       REVIEW OF SYSTEMS  =================  C: NEGATIVE for fever, chills, change in weight  I: NEGATIVE for worrisome rashes, moles or lesions  E/M: NEGATIVE for ear, mouth and throat problems  R: NEGATIVE for significant cough or SHORTNESS OF BREATH,   CV: NEGATIVE for chest pain, palpitations or peripheral edema  GI: NEGATIVE for nausea, abdominal pain, heartburn, or change in bowel habits  NEURO: NEGATIVE any motor/sensory changes  PSYCH: NEGATIVE for recent mood disorder    Physical Exam:  There were no vitals taken for this visit.   Patient is pleasant, in no acute distress, good  general condition.  Lung: no evidence of respiratory distress    Abdomen: Soft, nondistended, non tender. No masses. No rebound or guarding.   Exam: normal male  Skin: Warm and dry.  No redness.  Psych: normal mood and affect  Neuro: alert and oriented  Patient Name: TINY KITCHEN   MR#: [id number]   Specimen #: [id number]   Collected: 3/23/2018   Received: 3/23/2018   Reported: 3/27/2018 09:10   Ordering Phy(s): KAYLEE TIRADO     For improved result formatting, select 'View Enhanced Report Format' under    Linked Documents section.     SPECIMEN(S):   A: Prostate needle biopsy, left   B: Prostate needle biopsy, right     FINAL DIAGNOSIS:   A. Prostate, left, ultrasound-guided needle core biopsy:   - Prostatic adenocarcinoma        - Shelby score: 6 (3+3); Grade group 1        - Number of cores involved: three        - Total surface area involved: 15%        - Perineural invasion: Present        - Intraductal carcinoma: Not identified.        - Angiolymphatic invasion: Not identified.     B. Prostate, right, ultrasound-guided needle core biopsy   - No evidence of malignancy in the planes examined.     Electronically signed out by:     Joseph Crowe M.D., PhD     CLINICAL HISTORY:   Assessment/Plan:   (C61) Malignant neoplasm of prostate (H)  (primary encounter diagnosis)  Comment: path discussed.  Group 1 stage T1c prostate cancer  Plan: discussed natural h/o prostate cancer           Discussed tx options          Observation/surgery/XRT/hormonal/cryotherapy          Observation is reasonable given this type of cancer           Recheck psa in 4-6 months

## 2018-04-02 ENCOUNTER — TELEPHONE (OUTPATIENT)
Dept: UROLOGY | Facility: CLINIC | Age: 63
End: 2018-04-02

## 2018-04-02 NOTE — TELEPHONE ENCOUNTER
Reason for call:  Patient reporting a symptom    Symptom or request: Patient states he is still continuing to have blood in his urine intermittently from his biopsy. Patient would like to speak to care team regarding this.     Duration (how long have symptoms been present): 3/30    Have you been treated for this before? No    Additional comments: Blood in his urine intermittently since biopsy. Would like a work note as well. Please call to discuss.     Phone Number patient can be reached at:  Home number on file [phone] (home)    Best Time:  any    Can we leave a detailed message on this number:  YES    Call taken on 4/2/2018 at 9:23 AM by Guillermo Dominguez

## 2018-04-02 NOTE — TELEPHONE ENCOUNTER
Called and spoke to patient.   Patient states is having some blood in urine.  Patient reassured.   Not uncommon to have blood in the urine intermittently for 6-8 weeks.   Patient agrees.   Daina Booker RN

## 2018-04-09 ENCOUNTER — TELEPHONE (OUTPATIENT)
Dept: UROLOGY | Facility: CLINIC | Age: 63
End: 2018-04-09

## 2018-04-09 NOTE — TELEPHONE ENCOUNTER
Reason for Call:  Other call back    Detailed comments: patient calling to see if we received FMLA forms? Needs them filled out for missing work while he was under treatment. Please call to confirm we have paper work.     Phone Number Patient can be reached at: Home number on file [phone] (home)    Best Time: Any    Can we leave a detailed message on this number? YES    Call taken on 4/9/2018 at 12:37 PM by Vandana Moore

## 2018-04-10 ENCOUNTER — TELEPHONE (OUTPATIENT)
Dept: UROLOGY | Facility: CLINIC | Age: 63
End: 2018-04-10

## 2018-04-10 NOTE — TELEPHONE ENCOUNTER
Reason for Call:  Other call back    Detailed comments: Patient calling again regarding his FMLA forms. Wondering if we have them yet? He has given them our fax numbers and still nothing. He is very frustrated and needs this in by tomorrow. Is there a way we can contact them to have forms directly faxed to someone here? Please call patient.     Phone Number Patient can be reached at: Home number on file [phone] (home)    Best Time: ASAP    Can we leave a detailed message on this number? YES    Call taken on 4/10/2018 at 2:01 PM by Vandana Moore

## 2018-09-20 ENCOUNTER — OFFICE VISIT (OUTPATIENT)
Dept: UROLOGY | Facility: OTHER | Age: 63
End: 2018-09-20

## 2018-09-20 VITALS — OXYGEN SATURATION: 98 % | DIASTOLIC BLOOD PRESSURE: 89 MMHG | HEART RATE: 51 BPM | SYSTOLIC BLOOD PRESSURE: 153 MMHG

## 2018-09-20 DIAGNOSIS — C61 MALIGNANT NEOPLASM OF PROSTATE (H): Primary | ICD-10-CM

## 2018-09-20 DIAGNOSIS — I10 ESSENTIAL HYPERTENSION WITH GOAL BLOOD PRESSURE LESS THAN 140/90: ICD-10-CM

## 2018-09-20 LAB — PSA SERPL-MCNC: 9.71 UG/L (ref 0–4)

## 2018-09-20 PROCEDURE — 36415 COLL VENOUS BLD VENIPUNCTURE: CPT | Performed by: UROLOGY

## 2018-09-20 PROCEDURE — 84153 ASSAY OF PSA TOTAL: CPT | Performed by: UROLOGY

## 2018-09-20 PROCEDURE — 99213 OFFICE O/P EST LOW 20 MIN: CPT | Performed by: UROLOGY

## 2018-09-20 NOTE — MR AVS SNAPSHOT
After Visit Summary   9/20/2018    Jose Alejandro Eason    MRN: [id number]           Patient Information     Date Of Birth          1955        Visit Information        Provider Department      9/20/2018 10:15 AM Joey Galindo MD [facility]        Today's Diagnoses     Malignant neoplasm of prostate (H)    -  1    Essential hypertension with goal blood pressure less than 140/90           Follow-ups after your visit        Your next 10 appointments already scheduled     Sep 20, 2018 10:15 AM CDT   Return Visit with Joey Galindo MD   [facility] ([facility])    [address] [facility] [id number] [phone]              Who to contact     If you have questions or need follow up information about today's clinic visit or your schedule please contact [facility] directly at [phone].  Normal or non-critical lab and imaging results will be communicated to you by MyChart, letter or phone within 4 business days after the clinic has received the results. If you do not hear from us within 7 days, please contact the clinic through FreeWheelhart or phone. If you have a critical or abnormal lab result, we will notify you by phone as soon as possible.  Submit refill requests through Gochikuru or call your pharmacy and they will forward the refill request to us. Please allow 3 business days for your refill to be completed.          Additional Information About Your Visit        MyChart Information     Gochikuru gives you secure access to your electronic health record. If you see a primary care provider, you can also send messages to your care team and make appointments. If you have questions, please call your primary care clinic.  If you do not have a primary care provider, please call [phone] and they will assist you.        Care EveryWhere ID     This is your Care EveryWhere ID. This could be used by other organizations to access your  [place] medical records  [id number]        Your Vitals Were     Pulse Pulse Oximetry                51 98%           Blood Pressure from Last 3 Encounters:   09/20/18 153/89   02/23/18 (!) 130/93   12/01/17 120/74    Weight from Last 3 Encounters:   12/01/17 83.4 kg (183 lb 12.8 oz)              We Performed the Following     PSA tumor marker        Primary Care Provider Fax #    Physician No Ref-Primary [phone]       No address on file        Equal Access to Services     LAURA NOONAN : Hadii aad ku hadasho Soomaali, waaxda luqadaha, qaybta kaalmada adeegyada, waxay idiin hayaan adeeg khjo-ann ochoa . So [facility] [phone].    ATENCIÓN: Si habla español, tiene a valadez disposición servicios gratuitos de asistencia lingüística. Llame al [phone].    We comply with applicable federal civil rights laws and [place] laws. We do not discriminate on the basis of race, color, national origin, age, disability, sex, sexual orientation, or gender identity.            Thank you!     Thank you for choosing [facility]  for your care. Our goal is always to provide you with excellent care. Hearing back from our patients is one way we can continue to improve our services. Please take a few minutes to complete the written survey that you may receive in the mail after your visit with us. Thank you!             Your Updated Medication List - Protect others around you: Learn how to safely use, store and throw away your medicines at www.disposemymeds.org.          This list is accurate as of 9/20/18 10:13 AM.  Always use your most recent med list.                   Brand Name Dispense Instructions for use Diagnosis    amLODIPine 5 MG tablet    NORVASC     Take 5 mg by mouth

## 2018-09-20 NOTE — PROGRESS NOTES
Chief Complaint   Patient presents with     RECHECK     Prostate follow up.       Jose Alejandro Eason is a 62 year old male who presents today for follow up of   Chief Complaint   Patient presents with     RECHECK     Prostate follow up.    f/u for prostate cancer stage T1c group 1.  He is without any complaints.  He has not had his psa done.    Current Outpatient Prescriptions   Medication Sig Dispense Refill     amLODIPine (NORVASC) 5 MG tablet Take 5 mg by mouth       Allergies   Allergen Reactions     Sulfa Drugs       Past Medical History:   Diagnosis Date     Essential hypertension with goal blood pressure less than 140/90 2011     Past Surgical History:   Procedure Laterality Date     NO HISTORY OF SURGERY       Family History   Problem Relation Age of Onset     Ovarian Cancer Sister 70     Diabetes Daughter      type 1      Hypertension No family hx of      Coronary Artery Disease No family hx of      Cerebrovascular Disease No family hx of      Social History     Social History     Marital status: Single     Spouse name:      Number of children: 5     Years of education: N/A     Occupational History     CNA      Social History Main Topics     Smoking status: Never Smoker     Smokeless tobacco: Never Used     Alcohol use No      Comment: stopped drinking in 2010     Drug use: No     Sexual activity: Yes     Partners: Female     Other Topics Concern     None     Social History Narrative       REVIEW OF SYSTEMS  =================  C: NEGATIVE for fever, chills, change in weight  I: NEGATIVE for worrisome rashes, moles or lesions  E/M: NEGATIVE for ear, mouth and throat problems  R: NEGATIVE for significant cough or SHORTNESS OF BREATH,   CV: NEGATIVE for chest pain, palpitations or peripheral edema  GI: NEGATIVE for nausea, abdominal pain, heartburn, or change in bowel habits  NEURO: NEGATIVE any motor/sensory changes  PSYCH: NEGATIVE for recent mood disorder    Physical Exam:  /89 (BP Location: Left  arm, Patient Position: Sitting, Cuff Size: Adult Regular)  Pulse 51  SpO2 98%   Patient is pleasant, in no acute distress, good general condition.  Lung: no evidence of respiratory distress    Abdomen: Soft, nondistended, non tender. No masses. No rebound or guarding.   Exam: penis no discharge.  Testis no masses.  No scrotal skin lesion.  Prostate 30 gm smooth no nodule  Skin: Warm and dry.  No redness.  Psych: normal mood and affect  Neuro: alert and oriented  Patient Name: TINY KITCHEN   MR#: [id number]   Specimen #: [id number]   Collected: 3/23/2018   Received: 3/23/2018   Reported: 3/27/2018 09:10   Ordering Phy(s): KAYLEE TIRADO     For improved result formatting, select 'View Enhanced Report Format' under    Linked Documents section.     SPECIMEN(S):   A: Prostate needle biopsy, left   B: Prostate needle biopsy, right     FINAL DIAGNOSIS:   A. Prostate, left, ultrasound-guided needle core biopsy:   - Prostatic adenocarcinoma        - Shelby score: 6 (3+3); Grade group 1        - Number of cores involved: three        - Total surface area involved: 15%        - Perineural invasion: Present        - Intraductal carcinoma: Not identified.        - Angiolymphatic invasion: Not identified.     B. Prostate, right, ultrasound-guided needle core biopsy   - No evidence of malignancy in the planes examined.     Electronically signed out by:     Joseph Crowe M.D., PhD     CLINICAL HISTORY:   Assessment/Plan:   (C61) Malignant neoplasm of prostate (H)  (primary encounter diagnosis)  Comment:  On observation  Plan: psa today     HTN: Patient to follow up with Primary Care provider regarding elevated blood pressure.

## 2018-09-21 ENCOUNTER — TELEPHONE (OUTPATIENT)
Dept: UROLOGY | Facility: CLINIC | Age: 63
End: 2018-09-21

## 2018-09-21 DIAGNOSIS — R97.20 ELEVATED PROSTATE SPECIFIC ANTIGEN (PSA): Primary | ICD-10-CM

## 2018-09-21 NOTE — TELEPHONE ENCOUNTER
Called updated patient about lab results.   Dr. Galindo placed order for prostate MRI.   Patient agrees to complete.   Daina Booker RN

## 2018-10-06 ENCOUNTER — RADIANT APPOINTMENT (OUTPATIENT)
Dept: MRI IMAGING | Facility: CLINIC | Age: 63
End: 2018-10-06
Attending: UROLOGY
Payer: COMMERCIAL

## 2018-10-06 DIAGNOSIS — R97.20 ELEVATED PROSTATE SPECIFIC ANTIGEN (PSA): ICD-10-CM

## 2018-10-06 RX ORDER — GADOBUTROL 604.72 MG/ML
7.5 INJECTION INTRAVENOUS ONCE
Status: COMPLETED | OUTPATIENT
Start: 2018-10-06 | End: 2018-10-06

## 2018-10-06 RX ADMIN — GADOBUTROL 7.5 ML: 604.72 INJECTION INTRAVENOUS at 14:05

## 2018-10-06 NOTE — DISCHARGE INSTRUCTIONS
MRI Contrast Discharge Instructions    The IV contrast you received today will pass out of your body in your  urine. This will happen in the next 24 hours. You will not feel this process.  Your urine will not change color.    Drink at least 4 extra glasses of water or juice today (unless your doctor  has restricted your fluids). This reduces the stress on your kidneys.  You may take your regular medicines.    If you are on dialysis: It is best to have dialysis today.    If you have a reaction: Most reactions happen right away. If you have  any new symptoms after leaving the hospital (such as hives or swelling),  call your hospital at the correct number below. Or call your family doctor.  If you have breathing distress or wheezing, call 911.    Special instructions: ***    I have read and understand the above information.    Signature:______________________________________ Date:___________    Staff:__________________________________________ Date:___________     Time:__________    [facility] Departments:    ___[place]: [phone]  ___[facility]: [phone]  ___[place]: [phone] ___[facility]: [phone]  ___[facility]: [phone]  ___[facility]: [phone]  ___[phone]  ___[facility]: [phone]  ___[phone]

## 2018-10-06 NOTE — LETTER
[facility]  [address] [place] [address]      October 11, 2018    Jose Alejandro Eason  [address] [facility] [address]          Dear Jose Alejandro,    Enclosed is a copy of your results. Your MRI is normal. Please follow up with me in clinic in 4 months for a recheck. Please complete a lab appointment for your psa blood work prior to your appointment.     Results for orders placed or performed in visit on 10/06/18   MR Prostate wo & w Conrast    Narrative    MRI PROSTATE:  10/6/2018 2:44 PM    CLINICAL HISTORY: ; Elevated prostate specific antigen (PSA)    Additional history from EMR: Biopsy-proven prostatic adenocarcinoma  [place] 6 in the left prostate    Comparison: None available.    TECHNIQUE:     The following sequences were obtained: High-resolution axial  T2-weighted, coronal T2-weighted, 3D volumetric T2-weighted, axial  pre-contrast T1, axial diffusion-weighted, axial apparent diffusion  coefficient and axial dynamic contrast-enhanced T1. Postcontrast  images were evaluated on a separate workstation to evaluate dynamic  contrast enhancement.  Contrast dose: 7.5 mL Gadavist    The images are interpreted according to PI-RADS v.2    FINDINGS:  Prostate gland size: 3.6 x 4.4 x 4.0 cm  Volume: 32.9 cc    Peripheral zone: Hazy T2 hypointensity throughout the peripheral zone.  In the right lateral mid gland at the 9:00 position, there is an  ill-defined 6 mm T2 hypointense focus without restricted diffusion or  enhancement (series 6 image 48). No focal area of restricted diffusion  or abnormal enhancement.      PI-RADS:  Peripheral zone T2: 3  Diffusion-weighted image: 2    Contrast-enhanced images: Negative      Overall assessment: 2    Transitional zone: There are BPH type changes without suspicious  lesion.    PI-RADS:  Transition zone T2: 2  Diffusion-weighted image: 2  Contrast-enhanced images: Negative      Overall assessment: 2    Remainder of the pelvis:  No suspicious  lymphadenopathy or bone  lesion. Partially distended urinary bladder. Symmetric seminal  vesicles. No free fluid in the pelvis.      Impression    IMPRESSION:   1. Based on the most suspicious abnormality, this exam is  characterized as PIRADS 2 - Low probability.  Clinically significant  cancer is unlikely to be present.    2. No evidence of extraprostatic malignancy. No suspicious adenopathy  or evidence of pelvic metastases.    The images are interpreted according to PI-RADS v2.  [email or web address].acr.org/~/media/[email or web address]/[email or web address]/Resources  [email or web address]    I have personally reviewed the examination and initial interpretation  and I agree with the findings.    ADA FINNEY MD       If you have any questions or concerns, please call myself or my nurse at [phone].      Sincerely,    Joey Galindo MD

## 2019-06-13 ENCOUNTER — OFFICE VISIT (OUTPATIENT)
Dept: FAMILY MEDICINE | Facility: CLINIC | Age: 64
End: 2019-06-13

## 2019-06-13 ENCOUNTER — NURSE TRIAGE (OUTPATIENT)
Dept: FAMILY MEDICINE | Facility: OTHER | Age: 64
End: 2019-06-13

## 2019-06-13 VITALS
HEIGHT: 69 IN | BODY MASS INDEX: 26.81 KG/M2 | OXYGEN SATURATION: 99 % | RESPIRATION RATE: 16 BRPM | WEIGHT: 181 LBS | TEMPERATURE: 98.3 F | HEART RATE: 65 BPM | DIASTOLIC BLOOD PRESSURE: 66 MMHG | SYSTOLIC BLOOD PRESSURE: 96 MMHG

## 2019-06-13 DIAGNOSIS — K92.1 BLACK STOOLS: ICD-10-CM

## 2019-06-13 DIAGNOSIS — R10.13 ABDOMINAL PAIN, EPIGASTRIC: Primary | ICD-10-CM

## 2019-06-13 LAB
ERYTHROCYTE [DISTWIDTH] IN BLOOD BY AUTOMATED COUNT: 13.9 % (ref 10–15)
HCT VFR BLD AUTO: 36.8 % (ref 40–53)
HGB BLD-MCNC: 12.1 G/DL (ref 13.3–17.7)
MCH RBC QN AUTO: 30 PG (ref 26.5–33)
MCHC RBC AUTO-ENTMCNC: 32.9 G/DL (ref 31.5–36.5)
MCV RBC AUTO: 91 FL (ref 78–100)
PLATELET # BLD AUTO: 200 10E9/L (ref 150–450)
RBC # BLD AUTO: 4.04 10E12/L (ref 4.4–5.9)
WBC # BLD AUTO: 6.8 10E9/L (ref 4–11)

## 2019-06-13 PROCEDURE — 36415 COLL VENOUS BLD VENIPUNCTURE: CPT | Performed by: PHYSICIAN ASSISTANT

## 2019-06-13 PROCEDURE — 99214 OFFICE O/P EST MOD 30 MIN: CPT | Performed by: PHYSICIAN ASSISTANT

## 2019-06-13 PROCEDURE — 85027 COMPLETE CBC AUTOMATED: CPT | Performed by: PHYSICIAN ASSISTANT

## 2019-06-13 ASSESSMENT — PAIN SCALES - GENERAL: PAINLEVEL: MILD PAIN (3)

## 2019-06-13 ASSESSMENT — MIFFLIN-ST. JEOR: SCORE: 1608.45

## 2019-06-13 NOTE — PROGRESS NOTES
"Subjective     Jose Alejandro Eason is a 63 year old male who presents to clinic today for the following health issues:    HPI     ABDOMINAL PAIN     Onset: 5 days now when patient first felt the pain in center upper abdominal. Patient is concerns about ulcer. No hx of ulcer. Some pressure in the head and dizziness started yesterday. Noticed \"black stool\" since this Monday. Patient think he overused justin (fesh), green tea, honey possible the caused of the pain and color change of stools.    Took antiacids and it helped.     No pepto  Pain now, nausea, dizziness. Head hurts- pressure feeling.   No vomiting.   Last BM- yesterday- very black and diarrhea.   \"I feel sickly\"  He is on amlodipine - did not take it today.   BP here is 96/66. Recheck 92/60.   To eat he has had black Malian tea w/milk, toast. Feels little better when he eats. Worse on empty stomach.   No urinary sx.   Has never had colonscopy. 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 Felt poorly this morning.   No Pepto bismol.     Description:   Character: \"I feel pain\" now  Location: upper center abdomen   Radiation: None    Intensity: mild    Progression of Symptoms:  improving    Accompanying Signs & Symptoms:  Fever/Chills?: no   Gas/Bloating: no   Nausea: YES - \"it feel like im going to throw up\"   Vomitting: no   Diarrhea?: YES- had once yesterday morning.    Constipation:no   Dysuria or Hematuria: no      History:   Trauma: no   Previous similar pain: no    Previous tests done: none    Precipitating factors:   Does the pain change with:     Food: no      BM: no     Urination: no     Alleviating factors:  None     Therapies Tried and outcome: milk, antiacid     LMP:  not applicable         Patient Active Problem List   Diagnosis     Essential hypertension with goal blood pressure less than 140/90     Overweight (BMI 25.0-29.9)     Tinnitus, bilateral     Past " "Surgical History:   Procedure Laterality Date     NO HISTORY OF SURGERY         Social History     Tobacco Use     Smoking status: Never Smoker     Smokeless tobacco: Never Used   Substance Use Topics     Alcohol use: No     Comment: stopped drinking in 2010     Family History   Problem Relation Age of Onset     Ovarian Cancer Sister 70     Diabetes Daughter         type 1      Hypertension No family hx of      Coronary Artery Disease No family hx of      Cerebrovascular Disease No family hx of          Current Outpatient Medications   Medication Sig Dispense Refill     amLODIPine (NORVASC) 5 MG tablet Take 5 mg by mouth       Allergies   Allergen Reactions     Sulfa Drugs      BP Readings from Last 3 Encounters:   06/13/19 96/66   09/20/18 153/89   02/23/18 (!) 130/93    Wt Readings from Last 3 Encounters:   06/13/19 82.1 kg (181 lb)   12/01/17 83.4 kg (183 lb 12.8 oz)                    Reviewed and updated as needed this visit by Provider         Review of Systems   ROS COMP: Constitutional, HEENT, cardiovascular, pulmonary, gi and gu systems are negative, except as otherwise noted.      Objective    BP 96/66   Pulse 65   Temp 98.3  F (36.8  C)   Resp 16   Ht 1.74 m (5' 8.5\")   Wt 82.1 kg (181 lb)   SpO2 99%   BMI 27.12 kg/m    Body mass index is 27.12 kg/m .  Physical Exam   GENERAL: alert and no distress  EYES: Eyes grossly normal to inspection, PERRL and conjunctivae and sclerae normal  NECK: no adenopathy, no asymmetry, masses, or scars and thyroid normal to palpation  RESP: lungs clear to auscultation - no rales, rhonchi or wheezes  CV: regular rate and rhythm, normal S1 S2, no S3 or S4, no murmur, click or rub, no peripheral edema and peripheral pulses strong  ABDOMEN: soft, +epigastric tenderness and mild guarding, no hepatosplenomegaly, no masses and bowel sounds normal  MS: no gross musculoskeletal defects noted, no edema  NEURO: Normal strength and tone, mentation intact and speech normal  PSYCH: " "mentation appears normal, affect normal/bright    Diagnostic Test Results:  Results for orders placed or performed in visit on 06/13/19   CBC with platelets   Result Value Ref Range    WBC 6.8 4.0 - 11.0 10e9/L    RBC Count 4.04 (L) 4.4 - 5.9 10e12/L    Hemoglobin 12.1 (L) 13.3 - 17.7 g/dL    Hematocrit 36.8 (L) 40.0 - 53.0 %    MCV 91 78 - 100 fl    MCH 30.0 26.5 - 33.0 pg    MCHC 32.9 31.5 - 36.5 g/dL    RDW 13.9 10.0 - 15.0 %    Platelet Count 200 150 - 450 10e9/L          Assessment & Plan     1. Abdominal pain, epigastric  2. Black stools  Abd pain, black stools, nausea and dizziness  Concern for bleeding peptic ulcer  BP systolic in 90s/60s, with new dizziness. He has been holding his amlodipine due to his sx.   hgb mildly low. No prior for comparison  Advised further assessment and management in the ER.   Pt called a family member to drive him to [facility] ER.   - CBC with platelets     BMI:   Estimated body mass index is 27.12 kg/m  as calculated from the following:    Height as of this encounter: 1.74 m (5' 8.5\").    Weight as of this encounter: 82.1 kg (181 lb).     Follow Up: To the ER.   The patient was instructed to contact clinic for worsening symptoms, non-improvement as expected/discussed, and for questions regarding medications or treatment plan. Discussed parameters for follow up and included in After Visit Summary given to patient.        Return in about 1 week (around 6/20/2019).    Marely Austin PA-C  [facility]    "

## 2019-06-13 NOTE — TELEPHONE ENCOUNTER
"spoke with patient and daughter Selena    1. LOCATION: \"Where does it hurt?\"     Upper stomach    2. RADIATION: \"Does the pain shoot anywhere else?\" (e.g., chest, back)  no  3. ONSET: \"When did the pain begin?\" (Minutes, hours or days ago)       3 days: comes and goes, no pain now    4. SUDDEN: \"Gradual or sudden onset?\"      Gradual onset    5. PATTERN \"Does the pain come and go, or is it constant?\"     - If constant: \"Is it getting better, staying the same, or worsening?\"       (Note: Constant means the pain never goes away completely; most serious pain is constant and it progresses)      - If intermittent: \"How long does it last?\" \"Do you have pain now?\"      (Note: Intermittent means the pain goes away completely between bouts)      Comes and goes    6. SEVERITY: \"How bad is the pain?\"  (e.g., Scale 1-10; mild, moderate, or severe)     - MILD (1-3): doesn't interfere with normal activities, abdomen soft and not tender to touch      - MODERATE (4-7): interferes with normal activities or awakens from sleep, tender to touch      - SEVERE (8-10): excruciating pain, doubled over, unable to do any normal activities        No pain right now    7. RECURRENT SYMPTOM: \"Have you ever had this type of abdominal pain before?\" If so, ask: \"When was the last time?\" and \"What happened that time?\"       no  8. CAUSE: \"What do you think is causing the abdominal pain?\"      Unsure    9. RELIEVING/AGGRAVATING FACTORS: \"What makes it better or worse?\" (e.g., movement, antacids, bowel movement)      unsure  10. OTHER SYMPTOMS: \"Has there been any vomiting, diarrhea, constipation, or urine problems?\"          Normal but darker stools, no vomiting. No fever.  Eating and drinking ok.  Some dizziness here and there.    Additional Information    Negative: Passed out (i.e., fainted, collapsed and was not responding)    Negative: Shock suspected (e.g., cold/pale/clammy skin, too weak to stand, low BP, rapid pulse)    Negative: Sounds like " a life-threatening emergency to the triager    Negative: Chest pain    Negative: Pain is mainly in upper abdomen (if needed ask: 'is it mainly above the belly button?')    Negative: SEVERE abdominal pain (e.g., excruciating)    Negative: Vomiting red blood or black (coffee ground) material    Negative: Bloody, black, or tarry bowel movements    Negative: Unable to urinate (or only a few drops) and bladder feels very full    Negative: Pain in scrotum persists > 1 hour    Negative: Constant abdominal pain lasting > 2 hours    Negative: Vomiting bile (green color)    Negative: Patient sounds very sick or weak to the triager    Negative: Vomiting and abdomen looks much more swollen than usual    Negative: White of the eyes have turned yellow (i.e., jaundice)    Negative: Blood in urine (red, pink, or tea-colored)    Negative: Fever > 103 F (39.4 C)    Negative: Fever > 101 F (38.3 C) and over 60 years of age    Negative: Fever > 100.0 F (37.8 C) and has diabetes mellitus or a weak immune system (e.g., HIV positive, cancer chemotherapy, organ transplant, splenectomy, chronic steroids)    Negative: Fever > 100.0 F (37.8 C) and bedridden (e.g., nursing home patient, stroke, chronic illness, recovering from surgery)    MILD pain that comes and goes (cramps) lasts > 24 hours    Protocols used: ABDOMINAL PAIN - MALE-A-OH

## 2019-11-05 ENCOUNTER — HEALTH MAINTENANCE LETTER (OUTPATIENT)
Age: 64
End: 2019-11-05

## 2020-02-27 ENCOUNTER — TELEPHONE (OUTPATIENT)
Dept: FAMILY MEDICINE | Facility: CLINIC | Age: 65
End: 2020-02-27

## 2020-02-27 NOTE — TELEPHONE ENCOUNTER
Summary:    Patient is due/failing the following:   COLONOSCOPY and PHYSICAL    Action needed:   Patient needs office visit for Physical . and schedule a colonoscopy or complete a FIT test    Type of outreach:    Phone, left message for patient to call back.     Questions for provider review:    None                                                                                                                                    Carmen Anguiano CMA       Chart routed to Care Team .          Panel Management Review      Patient has the following on his problem list:     Hypertension   Last three blood pressure readings:  BP Readings from Last 3 Encounters:   06/13/19 96/66   09/20/18 153/89   02/23/18 (!) 130/93     Blood pressure: Passed    HTN Guidelines:  Less than 140/90      Composite cancer screening  Chart review shows that this patient is due/due soon for the following Colonoscopy

## 2020-11-22 ENCOUNTER — HEALTH MAINTENANCE LETTER (OUTPATIENT)
Age: 65
End: 2020-11-22

## 2021-05-27 ENCOUNTER — RECORDS - HEALTHEAST (OUTPATIENT)
Dept: ADMINISTRATIVE | Facility: CLINIC | Age: 66
End: 2021-05-27

## 2021-09-19 ENCOUNTER — HEALTH MAINTENANCE LETTER (OUTPATIENT)
Age: 66
End: 2021-09-19

## 2022-01-09 ENCOUNTER — HEALTH MAINTENANCE LETTER (OUTPATIENT)
Age: 67
End: 2022-01-09

## 2022-02-01 ENCOUNTER — OFFICE VISIT (OUTPATIENT)
Dept: UROLOGY | Facility: CLINIC | Age: 67
End: 2022-02-01
Payer: COMMERCIAL

## 2022-02-01 VITALS — DIASTOLIC BLOOD PRESSURE: 95 MMHG | OXYGEN SATURATION: 100 % | HEART RATE: 57 BPM | SYSTOLIC BLOOD PRESSURE: 158 MMHG

## 2022-02-01 DIAGNOSIS — C61 PROSTATE CANCER (H): Primary | ICD-10-CM

## 2022-02-01 DIAGNOSIS — I10 ESSENTIAL HYPERTENSION WITH GOAL BLOOD PRESSURE LESS THAN 140/90: ICD-10-CM

## 2022-02-01 PROCEDURE — 99204 OFFICE O/P NEW MOD 45 MIN: CPT | Performed by: UROLOGY

## 2022-02-01 NOTE — PROGRESS NOTES
S: Patient is a pleasant 66-year-old male who was seen for a consultation with regard to patient's prostate cancer.  He was last seen by me in 2018 when he was diagnosed with group 1 prostate cancer.  Prostate MRI at that time was unremarkable.  He has been seen by a urologist in [place] since.  Recently his PSA has not changed much.  It was less than 10.  However this prostate MRI show a PI-RADS 4 prostate nodule.  He denies any problem with urinations.  Current Outpatient Medications   Medication Sig Dispense Refill     amLODIPine (NORVASC) 5 MG tablet Take 5 mg by mouth       Allergies   Allergen Reactions     Sulfa Drugs      Past Medical History:   Diagnosis Date     Essential hypertension with goal blood pressure less than 140/90 2011     Past Surgical History:   Procedure Laterality Date     NO HISTORY OF SURGERY        Family History   Problem Relation Age of Onset     Ovarian Cancer Sister 70     Diabetes Daughter         type 1      Hypertension No family hx of      Coronary Artery Disease No family hx of      Cerebrovascular Disease No family hx of      Social History     Socioeconomic History     Marital status: Single     Spouse name:      Number of children: 5     Years of education: None     Highest education level: None   Occupational History     Occupation: CNA   Tobacco Use     Smoking status: Never Smoker     Smokeless tobacco: Never Used   Substance and Sexual Activity     Alcohol use: No     Comment: stopped drinking in 2010     Drug use: No     Sexual activity: Yes     Partners: Female   Other Topics Concern     Parent/sibling w/ CABG, MI or angioplasty before 65F 55M? Not Asked   Social History Narrative     None     Social Determinants of Health     Financial Resource Strain: Not on file   Food Insecurity: Not on file   Transportation Needs: Not on file   Physical Activity: Not on file   Stress: Not on file   Social Connections: Not on file   Intimate Partner Violence: Not on file    Housing Stability: Not on file       REVIEW OF SYSTEMS  =================  C: NEGATIVE for fever, chills, change in weight  I: NEGATIVE for worrisome rashes, moles or lesions  E/M: NEGATIVE for ear, mouth and throat problems  R: NEGATIVE for significant cough or SHORTNESS OF BREATH  CV:  NEGATIVE for chest pain, palpitations or peripheral edema  GI: NEGATIVE for nausea, abdominal pain, heartburn, or change in bowel habits  NEURO: NEGATIVE numbness/weakness  : see HPI  PSYCH: NEGATIVE depression/anxiety  LYmph: no new enlarged lymph nodes  Ortho: no new trauma/movements      Physical Exam:  BP (!) 158/95 (BP Location: Left arm, Patient Position: Chair, Cuff Size: Adult Large)   Pulse 57   SpO2 100%    Patient is pleasant, in no acute distress, good general condition.  Heart:  negative, PMI normal  Lung: no evidence of respiratory distress    Abdomen: Soft, nondistended, non tender. No masses. No rebound or guarding.   Exam: Penis no discharge.  Testes without any masses.  No scrotal skin lesion.  Prostate 30 to 40 g in size smooth no nodule nontender.  Skin: Warm and dry.  No redness.  Neuro: grossly normal  Musculaskeletal: moving all extremities  Psych normal mood and affect  Musculoskeletal  moving all extremities  Hematologic/Lymphatic/Immunologic: normal ant/post cervical, axillary, supraclavicular and inguinal nodes    Assessment/Plan:       66-year-old male with history of group 1 prostate cancer currently under active observation.  Recent prostate MRI showed PI-RADS 4 nodule.  Patient will need to have an MRI fusion prostate biopsy.  I will check with the technician to see whether we can pull imaging studies from [facility] to have the biopsy done here.  Otherwise, he needs to go back to [place] to have this done.    HTN: Jose Alejandro to follow up with Primary Care provider regarding elevated blood pressure.

## 2022-02-10 ENCOUNTER — TELEPHONE (OUTPATIENT)
Dept: UROLOGY | Facility: CLINIC | Age: 67
End: 2022-02-10
Payer: COMMERCIAL

## 2022-02-10 NOTE — TELEPHONE ENCOUNTER
"Pt called into clinic stating he received a phone call re: scheduling (a biopsy). Pt states \"I need to check my schedule, I will call you back.\"    Diana MORROW RN Urology 2/10/2022 3:13 PM    "

## 2022-02-10 NOTE — TELEPHONE ENCOUNTER
----- Message from Joey Galindo MD sent at 2/8/2022  3:14 PM CST -----  Please CC patient of results.  Schedule for just regular prostate biopsy

## 2022-02-10 NOTE — TELEPHONE ENCOUNTER
Per result note, patient needs in clinic trusp biopsy.  Left message for patient to call [phone]  Vivi Delgado CMA

## 2022-02-11 NOTE — TELEPHONE ENCOUNTER
Patient scheduled for TRUSP biopsy. Patient fully instructed. Info also entered into patient AVS of recent visit.  Need to send antibiotics to [facility] in [place].  Vivi Delgado, CMA

## 2022-02-11 NOTE — PATIENT INSTRUCTIONS
Prostate Ultrasound Instructions  Dr. Galindo  [address] [facility] [address]  [phone]      Appointment Date: 4/5/2022   Time: ARRIVE 10:00 for injection (10:30 biopsy)    ? Take antibiotics as directed on label. START THE DAY OF THE BIOPSY   ? 1 Fleets enema to be done 1    - 2 hours before procedure   NOTHING BY MOUTH AFTER THE ENEMA   ? If you are taking blood thinners (Aspirin, ibuprofen, Aleve) this is to be stopped ONE WEEK before the procedure. Tylenol is ok. If you are taking Coumadin, you must check with your primary doctor for further instructions  ? Please follow up in the office with the doctor ONE WEEK after the procedure to go over results      Follow up appointment:  Date: 4/12/22 Time:  11:00am  PROSTATE BIOPSY  Patient information:  You have had an examination of the prostate gland called a JOSEPHINE (digital rectal examination) where a finger was inserted into your rectum to enable the doctor to feel your prostate gland. You may also have had a PSA (prostate specific antigen) blood test.  Sometimes an elevated PSA level and an abnormal JOSEPHINE can be signs of prostate cancer. The doctor has recommended that you have a prostate biopsy.  What is a prostate biopsy:  You will be positioned for the biopsy as preferred by the physician.  An ultrasound probe is inserted into the rectum and a needle is then passed through the wall of the rectum and an injection of local anesthetic is given. Following the injection of local anesthetic, a needle will be inserted into the prostate gland to take a sample of cells. The doctor will usually take 12 separate biopsies.  You can hear some clicking sounds from the biopsy instrument whilst the biopsies are taken.  The procedure will take approximately five to ten minutes.   What are the risks?  ? Infection: As there is a chance of infection we give you antibiotics before the procedure to reduce the risk. However is you experience a fever, chills, nausea, or just not  feeling up to 72 hours after the biopsy you need to go directly to the emergency room.   ? Antibiotics: The physician will send a prescription to your preferred pharmacy.  We will call you once your culture results come back to let you that you can pick them up. You will follow the directions on the bottle.  Sometimes 2 different antibiotics are sent. Start the antibiotics on THE DAY OF THE BIOPSY.   ? Bleeding: Some men will see blood in the urine, semen and stool.  Bleeding can last for 6-8 weeks intermittently.  If you take any medication such as aspirin, warfarin, clopidogrel then the risk of bleeding will be higher.   ? Retention: Some men are unable to pass urine after the biopsy. This is called urine retention. This is very rare.  If you are unable to urinate please call our clinic or go to urgent care or the emergency room.   Getting the results:  The biopsy samples will be sent to a laboratory for examination by a pathologist.  It can take up to one week, sometimes longer, for your doctor to receive the results.  You will be given an appointment to return to clinic for the results of your biopsy within 1-2 weeks. If you do not receive an appointment for the results of the biopsy at the time of the scheduling, please contact the clinic to arrange an appointment for the results.   WE ARE NOT IN ANY CIRCUMSTANCES PERMITTED TO GIVE RESULTS OVER THE PHONE    What to expect following a biopsy:  You are advised to take it easy for the remainder of the day.  You may eat and drink as normal.  No restrictions to your normal daily routine.  As there is a risk of infection you will be given a course of antibiotics.  PLEASE COMPLETE THE FULL COURSE AS INSTRUCTED   Contact information:  Please call the clinic with any further questions [phone] *Do not leave an urgent message on this voicemail as we may not receive it until the following business day*

## 2022-02-14 NOTE — TELEPHONE ENCOUNTER
Called Levaquin 500mg po every day x3 days into [facility] pharmacy at [address] [place] [id number]. Ph [phone]. Spoke with Jennifer.     Diana MORROW RN Urology 2/14/2022 11:36 AM

## 2022-02-15 ENCOUNTER — HOSPITAL ENCOUNTER (INPATIENT)
Dept: GENERAL RADIOLOGY | Facility: CLINIC | Age: 67
End: 2022-02-15
Attending: UROLOGY
Payer: COMMERCIAL

## 2022-02-15 PROCEDURE — 999N000122 MR OUTSIDE READ

## 2022-04-05 ENCOUNTER — OFFICE VISIT (OUTPATIENT)
Dept: UROLOGY | Facility: CLINIC | Age: 67
End: 2022-04-05
Payer: COMMERCIAL

## 2022-04-05 ENCOUNTER — ANCILLARY PROCEDURE (OUTPATIENT)
Dept: ULTRASOUND IMAGING | Facility: CLINIC | Age: 67
End: 2022-04-05
Payer: COMMERCIAL

## 2022-04-05 DIAGNOSIS — C61 PROSTATE CANCER (H): ICD-10-CM

## 2022-04-05 DIAGNOSIS — C61 PROSTATE CANCER (H): Primary | ICD-10-CM

## 2022-04-05 PROCEDURE — 88305 TISSUE EXAM BY PATHOLOGIST: CPT | Performed by: PATHOLOGY

## 2022-04-05 PROCEDURE — 55700 PR BIOPSY OF PROSTATE,NEEDLE/PUNCH: CPT | Performed by: UROLOGY

## 2022-04-05 PROCEDURE — 88341 IMHCHEM/IMCYTCHM EA ADD ANTB: CPT | Performed by: PATHOLOGY

## 2022-04-05 PROCEDURE — 99000 SPECIMEN HANDLING OFFICE-LAB: CPT | Performed by: UROLOGY

## 2022-04-05 PROCEDURE — 76942 ECHO GUIDE FOR BIOPSY: CPT | Performed by: UROLOGY

## 2022-04-05 PROCEDURE — 96372 THER/PROPH/DIAG INJ SC/IM: CPT | Mod: 59 | Performed by: UROLOGY

## 2022-04-05 PROCEDURE — 88342 IMHCHEM/IMCYTCHM 1ST ANTB: CPT | Performed by: PATHOLOGY

## 2022-04-05 RX ORDER — GENTAMICIN 40 MG/ML
80 INJECTION, SOLUTION INTRAMUSCULAR; INTRAVENOUS ONCE
Status: COMPLETED | OUTPATIENT
Start: 2022-04-05 | End: 2022-04-05

## 2022-04-05 RX ADMIN — GENTAMICIN 80 MG: 40 INJECTION, SOLUTION INTRAMUSCULAR; INTRAVENOUS at 10:06

## 2022-04-05 NOTE — PROGRESS NOTES
Patient is here for prostate biopsy.  He was placed in the dorsal lithotomy position.  Prostate ultrasound placed transrectally.  The neurovascular bundle was anesthetized using 1% lidocaine.  Prostate measurements obtained.  Prostate size is 40 ml.  12 biopsies obtained under guidance of prostate ultrasound using biopsy needle.  Patient tolerated procedure.  Return to clinic in one week for biopsy result.

## 2022-04-05 NOTE — PROGRESS NOTES
Clinic Administered Medication Documentation    Administrations This Visit     gentamicin (GARAMYCIN) injection 80 mg     Admin Date  04/05/2022 Action  New Bag Dose  80 mg Route  Intramuscular Site  Right Ventrogluteal Administered By  Ольга Foy RN    Ordering Provider: Joey Galindo MD    Patient Supplied?: No    Comments: [facility]-[id number]  lot [id number]  exp3/31/2023              Ольга FOURNIER RN Specialty Triage 4/5/2022 10:08 AM

## 2022-04-08 LAB
PATH REPORT.COMMENTS IMP SPEC: ABNORMAL
PATH REPORT.COMMENTS IMP SPEC: YES
PATH REPORT.FINAL DX SPEC: ABNORMAL
PATH REPORT.GROSS SPEC: ABNORMAL
PATH REPORT.MICROSCOPIC SPEC OTHER STN: ABNORMAL
PATH REPORT.RELEVANT HX SPEC: ABNORMAL
PHOTO IMAGE: ABNORMAL

## 2022-04-12 ENCOUNTER — OFFICE VISIT (OUTPATIENT)
Dept: UROLOGY | Facility: CLINIC | Age: 67
End: 2022-04-12
Payer: COMMERCIAL

## 2022-04-12 ENCOUNTER — TELEPHONE (OUTPATIENT)
Dept: UROLOGY | Facility: CLINIC | Age: 67
End: 2022-04-12

## 2022-04-12 ENCOUNTER — MYC MEDICAL ADVICE (OUTPATIENT)
Dept: UROLOGY | Facility: CLINIC | Age: 67
End: 2022-04-12

## 2022-04-12 VITALS
HEART RATE: 58 BPM | DIASTOLIC BLOOD PRESSURE: 89 MMHG | SYSTOLIC BLOOD PRESSURE: 138 MMHG | TEMPERATURE: 98.5 F | OXYGEN SATURATION: 100 %

## 2022-04-12 DIAGNOSIS — C61 PROSTATE CANCER (H): Primary | ICD-10-CM

## 2022-04-12 PROCEDURE — 99213 OFFICE O/P EST LOW 20 MIN: CPT | Performed by: UROLOGY

## 2022-04-12 NOTE — PROGRESS NOTES
No chief complaint on file.      Jose Alejandro Eason is a 66 year old male who presents today for follow up of No chief complaint on file.   f/u for prostate cancer group 1 under observation.  He underwent recent repeat biopsy of prostate.  Recent prostate MRI showed PIRADS 2 nodule.      Current Outpatient Medications   Medication Sig Dispense Refill     amLODIPine (NORVASC) 5 MG tablet Take 5 mg by mouth       Allergies   Allergen Reactions     Sulfa Drugs       Past Medical History:   Diagnosis Date     Essential hypertension with goal blood pressure less than 140/90 2011     Past Surgical History:   Procedure Laterality Date     NO HISTORY OF SURGERY       Family History   Problem Relation Age of Onset     Ovarian Cancer Sister 70     Diabetes Daughter         type 1      Hypertension No family hx of      Coronary Artery Disease No family hx of      Cerebrovascular Disease No family hx of      Social History     Socioeconomic History     Marital status: Single     Spouse name:      Number of children: 5   Occupational History     Occupation: CNA   Tobacco Use     Smoking status: Never Smoker     Smokeless tobacco: Never Used   Substance and Sexual Activity     Alcohol use: No     Comment: stopped drinking in 2010     Drug use: No     Sexual activity: Yes     Partners: Female       REVIEW OF SYSTEMS  =================  C: NEGATIVE for fever, chills, change in weight  I: NEGATIVE for worrisome rashes, moles or lesions  E/M: NEGATIVE for ear, mouth and throat problems  R: NEGATIVE for significant cough or SHORTNESS OF BREATH  CV:  NEGATIVE for chest pain, palpitations or peripheral edema  GI: NEGATIVE for nausea, abdominal pain, heartburn, or change in bowel habits  NEURO: NEGATIVE numbness/weakness  : see HPI  PSYCH: NEGATIVE depression/anxiety  LYmph: no new enlarged lymph nodes  Ortho: no new trauma/movements    Physical Exam:    Blood pressure 138/89, pulse 58, temperature 98.5  F (36.9  C), SpO2 100  %.    Patient is pleasant, in no acute distress, good general condition.  Lung: no evidence of respiratory distress    Abdomen: Soft, nondistended, non tender. No masses. No rebound or guarding.   Exam: normal male  Skin: Warm and dry.  No redness.  Psych: normal mood and affect  Neuro: alert and oriented  Musculaskeletal: moving all extremities    Final Diagnosis   A.  Prostate, left, needle biopsy:  -Prostatic adenocarcinoma, acinar type, [place] grade 3+4 = 7, grade group is 2, number of cores involved is 2 of 7, total surface area involved is 10%, percent of grade 4 is 1%, perineural invasion is identified.  -High-grade prostatic intraepithelial neoplasia (HGPIN).     B.  Prostate, right, needle biopsy:  -Prostatic adenocarcinoma, acinar type, [place] grade 3+3 = 6, grade group is 1, number of cores involved is 1 of 7, total surface area involved is 5%, perineural invasion is  not identified.  -High-grade prostatic intraepithelial neoplasia (HGPIN).  -See comment.          Assessment/Plan:   (C61) Prostate cancer (H)  (primary encounter diagnosis)  Comment:    Plan:  A very small amount of group 2 prostate cancer found.  Treatment options discussed.  Observation vs surgery or radiation.  Patient favors observation.  See me in six months with repeat psa.

## 2022-04-12 NOTE — TELEPHONE ENCOUNTER
Routing to  specialty.    Patient called stating he is unable to review his results in Epic from 4/5    Patient just had visit with Dr. Galindo to discuss results Results not yet released in Epic     Office visit 4/12    Assessment/Plan:   (C61) Prostate cancer (H)  (primary encounter diagnosis)  Comment:    Plan:  A very small amount of group 2 prostate cancer found.  Treatment options discussed.  Observation vs surgery or radiation.  Patient favors observation.  See me in six months with repeat psa.         Imer García, RN, BSN, PHN  [facility]

## 2022-04-12 NOTE — TELEPHONE ENCOUNTER
Patient additionally walked into clinic to address results. Patient advised that the results are scheduled for release and will post to his my chart in next few days. Patient advised to contact us if they do not post and we can send a hard copy.  Vivi Delgado CMA

## 2022-06-14 NOTE — PROGRESS NOTES
MEDICAL RECORDS REQUEST   Radiation Oncology  [address] [place] [address]  PHONE: [phone]  Fax: [phone]        FUTURE VISIT INFORMATION                                                   Jose Alejandro Eason, : 1955 scheduled for future visit at [facility] Radiation Oncology    APPOINTMENT INFORMATION:    Date: 2022    Provider:  Dr. Escobar    Reason for Visit/Diagnosis: Prostate Cancer     REFERRAL INFORMATION:    Referring provider: Joey Galindo MD    Specialty:  UROLOGY    Referring providers clinic:      Clinic contact number:      RECORDS REQUESTED FOR VISIT                                                     SOFT TISSUE    C, MRI, PET/CT AND REPORTS yes     Xray Pelvis 2022    MRI Pelvis 2021   ANY RECENT LABS yes   SURGICAL REPORTS yes     2022  A.  Prostate, left, needle biopsy:  -Prostatic adenocarcinoma, acinar type, [place] grade 3+4 = 7, grade group is 2, number of cores involved is 2 of 7, total surface area involved is 10%, percent of grade 4 is 1%, perineural invasion is identified.  -High-grade prostatic intraepithelial neoplasia (HGPIN).     B.  Prostate, right, needle biopsy:   PATHOLOGY REPORTS yes   CHEMO & MEDICAL ONCOLOGY NOTES Urology Records are in EPIC   CURRENT MEDICATION LIST yes   *Send all radiation Prior radiation records for both [facility] and [facility] Radiation Oncology patients to [facility] with  ATTN: KHRIS/Flo Dosi/Physics     PRE-VISIT CHECKLIST      Record collection complete Yes   Appointment appropriately scheduled           (right time/right provider)

## 2022-06-21 ENCOUNTER — PRE VISIT (OUTPATIENT)
Dept: RADIATION ONCOLOGY | Facility: CLINIC | Age: 67
End: 2022-06-21

## 2022-06-21 ENCOUNTER — OFFICE VISIT (OUTPATIENT)
Dept: RADIATION ONCOLOGY | Facility: CLINIC | Age: 67
End: 2022-06-21
Attending: UROLOGY
Payer: COMMERCIAL

## 2022-06-21 VITALS
RESPIRATION RATE: 18 BRPM | OXYGEN SATURATION: 93 % | BODY MASS INDEX: 26.97 KG/M2 | HEART RATE: 61 BPM | SYSTOLIC BLOOD PRESSURE: 134 MMHG | DIASTOLIC BLOOD PRESSURE: 89 MMHG | TEMPERATURE: 98.3 F | WEIGHT: 180 LBS

## 2022-06-21 DIAGNOSIS — C61 PROSTATE CANCER (H): ICD-10-CM

## 2022-06-21 PROCEDURE — 99204 OFFICE O/P NEW MOD 45 MIN: CPT | Performed by: SURGERY

## 2022-06-21 RX ORDER — ATORVASTATIN CALCIUM 20 MG/1
20 TABLET, FILM COATED ORAL DAILY
COMMUNITY
Start: 2022-04-19

## 2022-06-21 ASSESSMENT — PAIN SCALES - GENERAL: PAINLEVEL: NO PAIN (0)

## 2022-06-21 NOTE — LETTER
2022         RE: Jose Alejandro Eason  [address] [place] [address]        Dear Colleague,    Thank you for referring your patient, Jose Alejandro Eason, to the [facility] RADIATION ONCOLOG[facility] [place]. Please see a copy of my visit note below.       Department of Radiation Oncology  [facility]  [facility]  [address] [address] [place] [address]  [phone]       Consultation Note    Name: Jose Alejandro Eason MRN: [id number]   : 1955   Date of Service: 2022  Referring: Dr. Galindo     Reason for consultation: prostate cancer    History of Present Illness   Mr. Eason is a 66 year old male who was is on active surveillance for low risk prostate cancer ( cT1c,PSA 6.99, GS 3+3=6, 2018) now presenting with progression to favorable, intermediate risk prostate cancer (cT1c, PSA 8.44, GS 3+4=7 (1 cores positive with 1% of kt 4).      Patient was initially noted to have an elevated PSA in 2017 to a value of 6.99.  He subsequently underwent biopsy in 3/23/2018 with pathology returning as a [place] 3+3 = 6 in 3/6 cores.  MRI in 2018 was negative for any lymphadenopathy, and did not identify any abnormal lesions with a PI-RADS 2 categorization.  At that time he opted for active surveillance.    His PSA has bounced around from 9 to a current value of 8.44 on 2021.  MRI was obtained on 2022 demonstrating a PI-RADS 2 categorization, without evidence of lymphadenopathy, essentially unchanged from 2018.  Biopsy was obtained on 2022 with pathology returning as a [place] 3+ 4 = 7 in 2/7 cores, with a total surface involvement of 10% on the left, with 1% of grade 4 disease. On the right, pathology returned as 3+3=6 in 1/7 cores, with 5% of total involved.JOSEPHINE was unremarkable.    The patient presents today to discuss the potential role of radiation therapy in treatment of his prostate cancer.    Symptoms:  Utility  score: 80  Continence: 0 pads used per day with a bother score of 1  Potency/erectile function score: 4 with a bother score of 1  IPSS score: 7/35  ALTON score:  19/25    PSA Levels:    Date  PSA    12/10/2017 6.99 (bx #1, 3+3=6, chose active surveillance)  9/20/2018 9.71  4/21/2021 9.24  9/18/2021 6.88  12/8/2021 8.44 (bx# 2, 3+4=7)      PSA DT: flat    PSA Density: 0.25    Life Expectancy: 18.81 (+)    Jimena Risks: Organ confined 67%, extracapsular extension 28%, seminal vesicle invasion 4% lymph node 1%    Past Medical History:   Past Medical History:   Diagnosis Date     Essential hypertension with goal blood pressure less than 140/90 2011     Hyperlipidemia      Prostate cancer (H) 03/23/2018       Past Surgical History:   Past Surgical History:   Procedure Laterality Date     PROSTATE BIOPSY  03/23/2018     PROSTATE BIOPSY  04/05/2022       Chemotherapy History:  No prior chemotherapy    Radiation History:  No prior radiation    Pregnant: No  Implanted Cardiac Devices: No    Medications:  Current Outpatient Medications   Medication     amLODIPine (NORVASC) 5 MG tablet     atorvastatin (LIPITOR) 20 MG tablet     No current facility-administered medications for this visit.         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 Pertinent findings are noted in the HPI.    Physical Exam   ECOG Status: 1    Vitals:  /89 (BP Location: Left arm, Patient Position: Chair, Cuff Size: Adult Large)   Pulse 61   Temp 98.3  F (36.8  C)  (Oral)   Resp 18   Wt 81.6 kg (180 lb)   SpO2 93%   BMI 26.97 kg/m      Gen: Alert, in NAD  Head: NC/AT  Eyes: PERRL, EOMI, sclera anicteric  Ears: No external auricular lesions  Nose/sinus: No rhinorrhea or epistaxis  Oral cavity/oropharynx: MMM, no visible oral cavity lesions  Neck: Full ROM, supple, no palpable adenopathy  Pulm: No wheezing, stridor or respiratory distress  CV: Extremities are warm and well-perfused, no cyanosis, no pedal edema  Abdominal: Normal bowel sounds, soft, nontender, no masses  : deferred   Musculoskeletal: Normal bulk and tone  Skin: Normal color and turgor  Neuro: A/Ox3, CN II-XII intact, normal gait    Imaging/Path/Labs   Imaging: per HPI, personally reviewed and in agreement     Path:     A.  Prostate, left, needle biopsy:  -Prostatic adenocarcinoma, acinar type, [place] grade 3+4 = 7, grade group is 2, number of cores involved is 2 of 7, total surface area involved is 10%, percent of grade 4 is 1%, perineural invasion is identified.  -High-grade prostatic intraepithelial neoplasia (HGPIN).     B.  Prostate, right, needle biopsy:  -Prostatic adenocarcinoma, acinar type, [place] grade 3+3 = 6, grade group is 1, number of cores involved is 1 of 7, total surface area involved is 5%, perineural invasion is  not identified.  -High-grade prostatic intraepithelial neoplasia (HGPIN).  -See comment.    Labs: per HPI, personally reviewed and in agreement     Assessment      Mr. Eason is a 66 year old male who was is on active surveillance for low risk prostate cancer ( cT1c,PSA 6.99, GS 3+3=6, 2018) now presenting with progression to favorable, intermediate risk prostate cancer (cT1c, PSA 8.44, GS 3+4=7 (1 cores positive with 1% of kt 4).     We discussed the management of favorable intermediate risk prostate cancer per NCCN guidelines.  In patients with a life expectancy greater than 10 years, options include active surveillance, external beam radiation or brachytherapy, and or  radical prostatectomy with pelvic lymph node dissection.     Active surveillance involves obtaining a PSA value no more often than every 6 months, a digital rectal exam no more than every 12 months, and prostate biopsy no more than every 12 months, and repeat MRI no more than every 12 months. These guidelines are subject to change if clinically indicated.      We also discussed the data regarding active surveillance. In particular, we discussed that results of the ProtecT trial that compared AS vs. RP vs. EBRT. We discussed prostate cancer mortality low irrespective of treatment modality (approximately 1% at 10 years), that approximately 55% of patients would convert to treatment within 10 years, and that both surgery and radiation were associated with lower incidences of disease progression and metastasis (YOLANDA Guaman 2016).    We discussed options of radiation particularly external beam radiation.  We discussed treating patient with radiation alone with a hypofractionated approach (preferred) with 70Gy/28 fractions.  Given that his lymph node risk is 1%,  target would only be the prostate gland and proximal seminal vesicles without treating the lymph nodes.  Given that patient is intermediate favorable risk, ADT is not recommended.    We also discussed the general side effects and differences between radiation and surgery:     Side Effects of Radiation Therapy   The main side effects of radiation therapy for prostate cancer are irritation/injury to the rectum/bladder and erectile dysfunction.  Radiation side effects, similar to surgery, are age dependent.  Furthermore, radiation side effects are dependent on the area being treated, the total dose of radiation, and whether or not additional treatment (hormone therapy, previous surgery, etc.) is/was given.     Side effects of external beam radiation may include diarrhea and colitis (irritated intestines).  Occasionally, normal bowel function does not return after  treatment is stopped.  Both during and after treatment, other side effects may include frequent urination, urge incontinence (feeling like you have to urinate all the time), burning sensation while urinating, and blood in the urine.  Less than 5% of men report problems with urinary incontinence, but this may increase over time as the effects of radiation can increase.     Radiation therapy may also cause a feeling of fatigue, which may persist for a few months after treatment stops.  About 30% to 60% of men who receive external beam radiation develop impotence.  Impotence usually does not occur right after radiation therapy but gradually develops over one or more years.  Eventually, the rate of impotence may equal that of surgery (Patti PROST-QA, [facility] 2008; Sridhar, ProtecT Trial QOL, [facility] 2016).     Side Effects of Prostate Surgery   The main side effects of radical prostatectomy are incontinence and impotence and are both age dependent as well as type of surgery performed.     Normal bladder control usually returns within several weeks or months after radical prostatectomy.  Passing a small amount of urine, when coughing, laughing, sneezing, or exercising, may persist permanently after prostatectomy in up to 35% of men.  Some patients (between 2% and 5%) have more serious stress incontinence, which may be permanent.     During the first 3 to 12 months after radical prostatectomy, most men will have erectile dysfunction and will need to use medicines or other treatments if they wish to have an erection.  The effect of this operation on a man's ability to achieve an erection is related to the patient's age and whether nerve-sparing surgery was done.  Nearly all men who have a radical prostatectomy should expect some permanent decrease in their ability to have an erection, but younger men may expect to retain more of their ability.  If the surgeon does not remove the nerves on either side of the prostate during  prostatectomy, the impotence rate is between 25% and 30% for men under 60.  But it occurs in 70% to 80% of men over 70, even if nerves on both sides are not removed.  By contrast, after standard radical prostatectomy (in which the nerves are removed), almost all men will become impotent, depending on their age.  Side effects are reported less often among men treated at [facility] cancer centers, where this type of surgery is performed on a more routine basis.  Specific questions/concerns were referred back to the urology oncology team to address further (Patti PROST-QA, [facility] 2008; Sridhar, ProtecT Trial QOL, [facility] 2016).      Plan     1. After extensive discussion patient wishes to active surveillance. This is a a reasonable option, given patient's PSA is relatively flat and he has a a small volume of grade 4 disease (1%) and a relatively small amount of prostate cancer in biopsy specimen.    2. He will continue active surveillance with Dr. Galindo's team.      All benefits and risks discussed, and patient is in agreement with the oncologic plan discussed above.     Thank you for allowing me to participate in your patient's care.  If you should require any additional information, please do not hesitate in contacting me.     Yuniel Escobar MD  Department of Radiation Oncology  [facility]         Again, thank you for allowing me to participate in the care of your patient.        Sincerely,        Yuniel Escobar MD

## 2022-06-21 NOTE — NURSING NOTE
INITIAL PATIENT ASSESSMENT      Diagnosis: prostate cancer    Prior radiation therapy: None    Prior chemotherapy: None    Prior hormonal therapy: No    Pain Eval:  Denies    Psychosocial  Living arrangements: lives at home in [place], [place].  Patient presents to clinic with wife and grand-daughter.  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  Patient reports he works seven 12 hour overnight shifts in a row and then is home in [place], [place] for seven days.  Patient reports he is originally from [place] and moved to the [place] approximately 20 years ago.  Fall Risk: independent  [facility] Risk Screening Completed: Yes Result: Negative   referral needs: Not needed    Advanced Directive: No, patient declines information packet  Implantable Cardiac Device: No  Authorization To Share Protected Health Information: YES - Date: 6/21/2022      Reproductive note: not recorded for male patient.      Review of Systems     Constitutional: Negative.    HENT: Positive for tinnitus. Negative for congestion, ear discharge, hearing loss, ear pain, nosebleeds, sinus pain and sore throat.    Eyes: Negative.    Respiratory: Negative.  Negative for stridor.    Cardiovascular: Negative.    Gastrointestinal: Negative.    Genitourinary: Negative.         AUA: 7  ALTON:  19  Musculoskeletal: Negative.    Skin: Negative.    Neurological: Negative.    Endo/Heme/Allergies: Negative.    Psychiatric/Behavioral: Negative.      Nurse face-to-face time: Level 5:  over 15 min face to face time.    Kassandra Monaco RN BSN OCN CBCN

## 2022-06-21 NOTE — PATIENT INSTRUCTIONS
Please contact [facility] RN with questions or concerns following today's appointment: [phone].    Thank you!

## 2022-06-21 NOTE — PROGRESS NOTES
Department of Radiation Oncology  [facility]    Health: Cancer Center  [facility]  [address] [place] [id number]  [phone]       Consultation Note    Name: Jose Alejandro Eason MRN: [id number]   : 1955   Date of Service: 2022  Referring: Dr. Galindo     Reason for consultation: prostate cancer    History of Present Illness   Mr. Eason is a 66 year old male who was is on active surveillance for low risk prostate cancer ( cT1c,PSA 6.99, GS 3+3=6, 2018) now presenting with progression to favorable, intermediate risk prostate cancer (cT1c, PSA 8.44, GS 3+4=7 (1 cores positive with 1% of kt 4).      Patient was initially noted to have an elevated PSA in 2017 to a value of 6.99.  He subsequently underwent biopsy in 3/23/2018 with pathology returning as a [place] 3+3 = 6 in 3/6 cores.  MRI in 2018 was negative for any lymphadenopathy, and did not identify any abnormal lesions with a PI-RADS 2 categorization.  At that time he opted for active surveillance.    His PSA has bounced around from 9 to a current value of 8.44 on 2021.  MRI was obtained on 2022 demonstrating a PI-RADS 2 categorization, without evidence of lymphadenopathy, essentially unchanged from 2018.  Biopsy was obtained on 2022 with pathology returning as a [place] 3+ 4 = 7 in 2/7 cores, with a total surface involvement of 10% on the left, with 1% of grade 4 disease. On the right, pathology returned as 3+3=6 in 1/7 cores, with 5% of total involved.JOSEPHINE was unremarkable.    The patient presents today to discuss the potential role of radiation therapy in treatment of his prostate cancer.    Symptoms:  Utility score: 80  Continence: 0 pads used per day with a bother score of 1  Potency/erectile function score: 4 with a bother score of 1  IPSS score: 7/35  ALTON score:  19/25    PSA Levels:    Date  PSA    12/10/2017 6.99 (bx #1, 3+3=6, chose active  surveillance)  9/20/2018 9.71  4/21/2021 9.24  9/18/2021 6.88  12/8/2021 8.44 (bx# 2, 3+4=7)      PSA DT: flat    PSA Density: 0.25    Life Expectancy: 18.81 (+)    Jimena Risks: Organ confined 67%, extracapsular extension 28%, seminal vesicle invasion 4% lymph node 1%    Past Medical History:   Past Medical History:   Diagnosis Date     Essential hypertension with goal blood pressure less than 140/90 2011     Hyperlipidemia      Prostate cancer (H) 03/23/2018       Past Surgical History:   Past Surgical History:   Procedure Laterality Date     PROSTATE BIOPSY  03/23/2018     PROSTATE BIOPSY  04/05/2022       Chemotherapy History:  No prior chemotherapy    Radiation History:  No prior radiation    Pregnant: No  Implanted Cardiac Devices: No    Medications:  Current Outpatient Medications   Medication     amLODIPine (NORVASC) 5 MG tablet     atorvastatin (LIPITOR) 20 MG tablet     No current facility-administered medications for this visit.         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 Pertinent findings are noted in the HPI.    Physical Exam   ECOG Status: 1    Vitals:  /89 (BP Location: Left arm, Patient Position: Chair, Cuff Size: Adult Large)   Pulse 61   Temp 98.3  F (36.8  C) (Oral)   Resp 18   Wt 81.6 kg (180 lb)   SpO2 93%   BMI 26.97 kg/m      Gen: Alert, in NAD  Head: NC/AT  Eyes: PERRL, EOMI, sclera anicteric  Ears: No external auricular lesions  Nose/sinus: No rhinorrhea or epistaxis  Oral cavity/oropharynx:  MMM, no visible oral cavity lesions  Neck: Full ROM, supple, no palpable adenopathy  Pulm: No wheezing, stridor or respiratory distress  CV: Extremities are warm and well-perfused, no cyanosis, no pedal edema  Abdominal: Normal bowel sounds, soft, nontender, no masses  : deferred   Musculoskeletal: Normal bulk and tone  Skin: Normal color and turgor  Neuro: A/Ox3, CN II-XII intact, normal gait    Imaging/Path/Labs   Imaging: per HPI, personally reviewed and in agreement     Path:     A.  Prostate, left, needle biopsy:  -Prostatic adenocarcinoma, acinar type, [place] grade 3+4 = 7, grade group is 2, number of cores involved is 2 of 7, total surface area involved is 10%, percent of grade 4 is 1%, perineural invasion is identified.  -High-grade prostatic intraepithelial neoplasia (HGPIN).     B.  Prostate, right, needle biopsy:  -Prostatic adenocarcinoma, acinar type, [place] grade 3+3 = 6, grade group is 1, number of cores involved is 1 of 7, total surface area involved is 5%, perineural invasion is  not identified.  -High-grade prostatic intraepithelial neoplasia (HGPIN).  -See comment.    Labs: per HPI, personally reviewed and in agreement     Assessment      Mr. Eason is a 66 year old male who was is on active surveillance for low risk prostate cancer ( cT1c,PSA 6.99, GS 3+3=6, 2018) now presenting with progression to favorable, intermediate risk prostate cancer (cT1c, PSA 8.44, GS 3+4=7 (1 cores positive with 1% of kt 4).     We discussed the management of favorable intermediate risk prostate cancer per NCCN guidelines.  In patients with a life expectancy greater than 10 years, options include active surveillance, external beam radiation or brachytherapy, and or radical prostatectomy with pelvic lymph node dissection.     Active surveillance involves obtaining a PSA value no more often than every 6 months, a digital rectal exam no more than every 12 months, and prostate biopsy no more than every 12  months, and repeat MRI no more than every 12 months. These guidelines are subject to change if clinically indicated.      We also discussed the data regarding active surveillance. In particular, we discussed that results of the ProtecT trial that compared AS vs. RP vs. EBRT. We discussed prostate cancer mortality low irrespective of treatment modality (approximately 1% at 10 years), that approximately 55% of patients would convert to treatment within 10 years, and that both surgery and radiation were associated with lower incidences of disease progression and metastasis (YOLANDA Guaman 2016).    We discussed options of radiation particularly external beam radiation.  We discussed treating patient with radiation alone with a hypofractionated approach (preferred) with 70Gy/28 fractions.  Given that his lymph node risk is 1%,  target would only be the prostate gland and proximal seminal vesicles without treating the lymph nodes.  Given that patient is intermediate favorable risk, ADT is not recommended.    We also discussed the general side effects and differences between radiation and surgery:     Side Effects of Radiation Therapy   The main side effects of radiation therapy for prostate cancer are irritation/injury to the rectum/bladder and erectile dysfunction.  Radiation side effects, similar to surgery, are age dependent.  Furthermore, radiation side effects are dependent on the area being treated, the total dose of radiation, and whether or not additional treatment (hormone therapy, previous surgery, etc.) is/was given.     Side effects of external beam radiation may include diarrhea and colitis (irritated intestines).  Occasionally, normal bowel function does not return after treatment is stopped.  Both during and after treatment, other side effects may include frequent urination, urge incontinence (feeling like you have to urinate all the time), burning sensation while urinating, and blood in the urine.  Less than  5% of men report problems with urinary incontinence, but this may increase over time as the effects of radiation can increase.     Radiation therapy may also cause a feeling of fatigue, which may persist for a few months after treatment stops.  About 30% to 60% of men who receive external beam radiation develop impotence.  Impotence usually does not occur right after radiation therapy but gradually develops over one or more years.  Eventually, the rate of impotence may equal that of surgery (Patti PROST-QA, [facility] 2008; Sridhar, ProtecT Trial QOL, [facility] 2016).     Side Effects of Prostate Surgery   The main side effects of radical prostatectomy are incontinence and impotence and are both age dependent as well as type of surgery performed.     Normal bladder control usually returns within several weeks or months after radical prostatectomy.  Passing a small amount of urine, when coughing, laughing, sneezing, or exercising, may persist permanently after prostatectomy in up to 35% of men.  Some patients (between 2% and 5%) have more serious stress incontinence, which may be permanent.     During the first 3 to 12 months after radical prostatectomy, most men will have erectile dysfunction and will need to use medicines or other treatments if they wish to have an erection.  The effect of this operation on a man's ability to achieve an erection is related to the patient's age and whether nerve-sparing surgery was done.  Nearly all men who have a radical prostatectomy should expect some permanent decrease in their ability to have an erection, but younger men may expect to retain more of their ability.  If the surgeon does not remove the nerves on either side of the prostate during prostatectomy, the impotence rate is between 25% and 30% for men under 60.  But it occurs in 70% to 80% of men over 70, even if nerves on both sides are not removed.  By contrast, after standard radical prostatectomy (in which the nerves are  removed), almost all men will become impotent, depending on their age.  Side effects are reported less often among men treated at [facility] cancer centers, where this type of surgery is performed on a more routine basis.  Specific questions/concerns were referred back to the urology oncology team to address further (Patti PROST-QA, [facility] 2008; Sridhar, ProtecT Trial QOL, [facility] 2016).      Plan     1. After extensive discussion patient wishes to active surveillance. This is a a reasonable option, given patient's PSA is relatively flat and he has a a small volume of grade 4 disease (1%) and a relatively small amount of prostate cancer in biopsy specimen.    2. He will continue active surveillance with Dr. Galindo's team.      All benefits and risks discussed, and patient is in agreement with the oncologic plan discussed above.     Thank you for allowing me to participate in your patient's care.  If you should require any additional information, please do not hesitate in contacting me.     Yuniel Escobar MD  Department of Radiation Oncology  [facility]

## 2022-11-11 ENCOUNTER — OFFICE VISIT (OUTPATIENT)
Dept: UROLOGY | Facility: CLINIC | Age: 67
End: 2022-11-11
Payer: COMMERCIAL

## 2022-11-11 VITALS — OXYGEN SATURATION: 100 % | HEART RATE: 64 BPM | DIASTOLIC BLOOD PRESSURE: 94 MMHG | SYSTOLIC BLOOD PRESSURE: 158 MMHG

## 2022-11-11 DIAGNOSIS — C61 PROSTATE CANCER (H): Primary | ICD-10-CM

## 2022-11-11 DIAGNOSIS — I10 ESSENTIAL HYPERTENSION WITH GOAL BLOOD PRESSURE LESS THAN 140/90: ICD-10-CM

## 2022-11-11 LAB — PSA SERPL-MCNC: 11.7 UG/L (ref 0–4)

## 2022-11-11 PROCEDURE — 84153 ASSAY OF PSA TOTAL: CPT | Performed by: UROLOGY

## 2022-11-11 PROCEDURE — 36415 COLL VENOUS BLD VENIPUNCTURE: CPT | Performed by: UROLOGY

## 2022-11-11 PROCEDURE — 99213 OFFICE O/P EST LOW 20 MIN: CPT | Performed by: UROLOGY

## 2022-11-11 NOTE — PROGRESS NOTES
No chief complaint on file.      Jose Alejandro Eason is a 67 year old male who presents today for follow up of No chief complaint on file.   f/u for prostate cancer group 1 under observation.  He underwent recent repeat biopsy of prostate on 4/22 that showed group 2 prostate cancer.  He had a 2nd opinion with local urologist and also has seen Dr. Escobar from radiation oncology.  he elects observation .      Current Outpatient Medications   Medication Sig Dispense Refill     amLODIPine (NORVASC) 5 MG tablet Take 5 mg by mouth       atorvastatin (LIPITOR) 20 MG tablet Take 20 mg by mouth daily       Allergies   Allergen Reactions     Sulfa Drugs       Past Medical History:   Diagnosis Date     Essential hypertension with goal blood pressure less than 140/90 2011     Hyperlipidemia      Prostate cancer (H) 03/23/2018     Past Surgical History:   Procedure Laterality Date     PROSTATE BIOPSY  03/23/2018     PROSTATE BIOPSY  04/05/2022     Family History   Problem Relation Age of Onset     Ovarian Cancer Sister 70     Diabetes Daughter         type 1      Hypertension No family hx of      Coronary Artery Disease No family hx of      Cerebrovascular Disease No family hx of      Social History     Socioeconomic History     Marital status: Single     Spouse name:      Number of children: 5   Occupational History     Occupation: CNA   Tobacco Use     Smoking status: Never Smoker     Smokeless tobacco: Never Used   Substance and Sexual Activity     Alcohol use: No     Comment: stopped drinking in 2010     Drug use: No     Sexual activity: Yes     Partners: Female       REVIEW OF SYSTEMS  =================  C: NEGATIVE for fever, chills, change in weight  I: NEGATIVE for worrisome rashes, moles or lesions  E/M: NEGATIVE for ear, mouth and throat problems  R: NEGATIVE for significant cough or SHORTNESS OF BREATH  CV:  NEGATIVE for chest pain, palpitations or peripheral edema  GI: NEGATIVE for nausea, abdominal pain,  heartburn, or change in bowel habits  NEURO: NEGATIVE numbness/weakness  : see HPI  PSYCH: NEGATIVE depression/anxiety  LYmph: no new enlarged lymph nodes  Ortho: no new trauma/movements    Physical Exam:    Blood pressure (!) 158/94, pulse 64, SpO2 100 %.    Patient is pleasant, in no acute distress, good general condition.  Lung: no evidence of respiratory distress    Abdomen: Soft, nondistended, non tender. No masses. No rebound or guarding.   Exam: penis no discharge.  Testis no masses.  No scrotal skin lesion.  Prostate smooth no nodule.  Skin: Warm and dry.  No redness.  Psych: normal mood and affect  Neuro: alert and oriented  Musculaskeletal: moving all extremities    Final Diagnosis   A.  Prostate, left, needle biopsy:  -Prostatic adenocarcinoma, acinar type, [place] grade 3+4 = 7, grade group is 2, number of cores involved is 2 of 7, total surface area involved is 10%, percent of grade 4 is 1%, perineural invasion is identified.  -High-grade prostatic intraepithelial neoplasia (HGPIN).     B.  Prostate, right, needle biopsy:  -Prostatic adenocarcinoma, acinar type, [place] grade 3+3 = 6, grade group is 1, number of cores involved is 1 of 7, total surface area involved is 5%, perineural invasion is  not identified.  -High-grade prostatic intraepithelial neoplasia (HGPIN).  -See comment.          Assessment/Plan:   (C61) Prostate cancer (H)  (primary encounter diagnosis)  Comment:    Plan:  psa in six months            Check psa today    HTN: Jose Alejandro to follow up with Primary Care provider regarding elevated blood pressure.

## 2022-11-21 ENCOUNTER — HEALTH MAINTENANCE LETTER (OUTPATIENT)
Age: 67
End: 2022-11-21

## 2023-07-10 NOTE — TELEPHONE ENCOUNTER
Called and spoke to patient.   Gave direct fax number to patient again.   Daina Booker RN      Patient is scheduled for surgery with Dr. Dunn    Spoke with: Rocio    Date of Surgery: 11/28/23    Location: ASC    Informed patient they will need an adult  Yes    H&P: Scheduled with PCP    Additional imaging/appointments: Unable to schedule POP due to no available appt template in [facility].      Surgery packet: Received     Additional comments: N/A        Kalee Panchal on 7/10/2023 at 3:46 PM

## 2023-07-21 ENCOUNTER — OFFICE VISIT (OUTPATIENT)
Dept: UROLOGY | Facility: CLINIC | Age: 68
End: 2023-07-21
Payer: COMMERCIAL

## 2023-07-21 VITALS — HEART RATE: 62 BPM | DIASTOLIC BLOOD PRESSURE: 97 MMHG | OXYGEN SATURATION: 98 % | SYSTOLIC BLOOD PRESSURE: 146 MMHG

## 2023-07-21 DIAGNOSIS — C61 PROSTATE CANCER (H): Primary | ICD-10-CM

## 2023-07-21 DIAGNOSIS — I10 ESSENTIAL HYPERTENSION WITH GOAL BLOOD PRESSURE LESS THAN 140/90: ICD-10-CM

## 2023-07-21 LAB — PSA SERPL DL<=0.01 NG/ML-MCNC: 10.7 NG/ML (ref 0–4.5)

## 2023-07-21 PROCEDURE — 99213 OFFICE O/P EST LOW 20 MIN: CPT | Performed by: UROLOGY

## 2023-07-21 PROCEDURE — 36415 COLL VENOUS BLD VENIPUNCTURE: CPT | Performed by: UROLOGY

## 2023-07-21 PROCEDURE — 84153 ASSAY OF PSA TOTAL: CPT | Performed by: UROLOGY

## 2023-07-21 NOTE — PATIENT INSTRUCTIONS
Please call Dr. Escobar at [facility] to schedule an appointment, [phone].    Please call our office if you have questions or need to report any information, [phone].

## 2023-07-21 NOTE — PROGRESS NOTES
No chief complaint on file.      Jose Alejandro Eason is a 67 year old male who presents today for follow up of No chief complaint on file.   f/u for prostate cancer group 1/2 under observation.  He underwent recent  biopsy of prostate on 4/22 that showed group 2 prostate cancer.  He had a 2nd opinion with local urologist and also has seen Dr. Escobar from radiation oncology.  he elects observation .      Current Outpatient Medications   Medication Sig Dispense Refill     amLODIPine (NORVASC) 5 MG tablet Take 5 mg by mouth       atorvastatin (LIPITOR) 20 MG tablet Take 20 mg by mouth daily       Allergies   Allergen Reactions     Sulfa Antibiotics       Past Medical History:   Diagnosis Date     Essential hypertension with goal blood pressure less than 140/90 2011     Hyperlipidemia      Prostate cancer (H) 03/23/2018     Past Surgical History:   Procedure Laterality Date     PROSTATE BIOPSY  03/23/2018     PROSTATE BIOPSY  04/05/2022     Family History   Problem Relation Age of Onset     Ovarian Cancer Sister 70     Diabetes Daughter         type 1      Hypertension No family hx of      Coronary Artery Disease No family hx of      Cerebrovascular Disease No family hx of      Social History     Socioeconomic History     Marital status: Single     Spouse name:      Number of children: 5   Occupational History     Occupation: CNA   Tobacco Use     Smoking status: Never Smoker     Smokeless tobacco: Never Used   Substance and Sexual Activity     Alcohol use: No     Comment: stopped drinking in 2010     Drug use: No     Sexual activity: Yes     Partners: Female       REVIEW OF SYSTEMS  =================  C: NEGATIVE for fever, chills, change in weight  I: NEGATIVE for worrisome rashes, moles or lesions  E/M: NEGATIVE for ear, mouth and throat problems  R: NEGATIVE for significant cough or SHORTNESS OF BREATH  CV:  NEGATIVE for chest pain, palpitations or peripheral edema  GI: NEGATIVE for nausea, abdominal pain,  heartburn, or change in bowel habits  NEURO: NEGATIVE numbness/weakness  : see HPI  PSYCH: NEGATIVE depression/anxiety  LYmph: no new enlarged lymph nodes  Ortho: no new trauma/movements    Physical Exam:    Blood pressure (!) 146/97, pulse 62, SpO2 98 %.    Patient is pleasant, in no acute distress, good general condition.  Lung: no evidence of respiratory distress    Abdomen: Soft, nondistended, non tender. No masses. No rebound or guarding.   Exam: penis no discharge.  Testis no masses.  No scrotal skin lesion.  Prostate smooth no nodule.  Skin: Warm and dry.  No redness.  Psych: normal mood and affect  Neuro: alert and oriented  Musculaskeletal: moving all extremities    Final Diagnosis   A.  Prostate, left, needle biopsy:  -Prostatic adenocarcinoma, acinar type, [place] grade 3+4 = 7, grade group is 2, number of cores involved is 2 of 7, total surface area involved is 10%, percent of grade 4 is 1%, perineural invasion is identified.  -High-grade prostatic intraepithelial neoplasia (HGPIN).     B.  Prostate, right, needle biopsy:  -Prostatic adenocarcinoma, acinar type, [place] grade 3+3 = 6, grade group is 1, number of cores involved is 1 of 7, total surface area involved is 5%, perineural invasion is  not identified.  -High-grade prostatic intraepithelial neoplasia (HGPIN).  -See comment.          Assessment/Plan:   (C61) Prostate cancer (H)  (primary encounter diagnosis)  Comment:    Plan:  psa today            Patient wants to proceed with radiation              HTN: Jose Alejandro to follow up with Primary Care provider regarding elevated blood pressure.

## 2023-09-15 ENCOUNTER — OFFICE VISIT (OUTPATIENT)
Dept: RADIATION ONCOLOGY | Facility: CLINIC | Age: 68
End: 2023-09-15
Payer: COMMERCIAL

## 2023-09-15 VITALS
OXYGEN SATURATION: 96 % | WEIGHT: 178.7 LBS | TEMPERATURE: 97.8 F | BODY MASS INDEX: 26.78 KG/M2 | RESPIRATION RATE: 18 BRPM | SYSTOLIC BLOOD PRESSURE: 121 MMHG | DIASTOLIC BLOOD PRESSURE: 84 MMHG | HEART RATE: 60 BPM

## 2023-09-15 DIAGNOSIS — C61 PROSTATE CANCER (H): Primary | ICD-10-CM

## 2023-09-15 PROCEDURE — 99214 OFFICE O/P EST MOD 30 MIN: CPT | Performed by: SURGERY

## 2023-09-15 ASSESSMENT — PAIN SCALES - GENERAL: PAINLEVEL: SEVERE PAIN (7)

## 2023-09-15 NOTE — PROGRESS NOTES
Department of Radiation Oncology  [facility]    Health: Cancer Center  [facility]  [id number] [address] [place] [address]  [phone]       Consultation Note    Name: Jose Alejandro Eason MRN: [id number]   : 1955   Date of Service: Sep 15, 2023   Referring: Dr. Galindo     Reason for consultation: prostate cancer    History of Present Illness     Mr. Eason is a 66 year old male who was is on active surveillance for low risk prostate cancer ( cT1c,PSA 6.99, GS 3+3=6, 2018) now presenting with progression to unfavorable, intermediate risk prostate cancer (cT1c, PSA 11.7, GS 3+4=7 (3 cores positive with 1% of kt 4).      Patient was initially noted to have an elevated PSA in 2017 to a value of 6.99.  He subsequently underwent biopsy in 3/23/2018 with pathology returning as a [place] 3+3 = 6 in 3/6 cores.  MRI in 2018 was negative for any lymphadenopathy, and did not identify any abnormal lesions with a PI-RADS 2 categorization.  At that time he opted for active surveillance.    His PSA has bounced around from 9 to a value of 8.44 on 2021.  MRI was obtained on 2022 demonstrating a PI-RADS 2 categorization, without evidence of lymphadenopathy, essentially unchanged from 2018.  Biopsy was obtained on 2022 with pathology returning as a [place] 3+ 4 = 7 in 2/7 cores, with a total surface involvement of 10% on the left, with 1% of grade 4 disease. On the right, pathology returned as 3+3=6 in 1/7 cores, with 5% of total involved.JOSEPHINE was unremarkable.    Patient was seen in radiation clinic over a year ago and at that point he opted for active surveillance.  He has been following with Dr. Galindo.  PSA campos to a value of 11.7 on 2022 and most recently to 10.7 in 2023.  Patient is now decided he wishes to pursue treatment.  He discussed surgical options with Dr. Galindo but prefers not surgical management.    The patient presents  today to discuss the potential role of radiation therapy in treatment of his prostate cancer.    Symptoms:  Utility score: 80  Continence: 0 pads used per day with a bother score of 1  Potency/erectile function score: 4 with a bother score of 1  IPSS score: 7/35  ALTON score:  19/25    PSA Levels:    Date  PSA    12/10/2017 6.99 (bx #1, 3+3=6, chose active surveillance)  9/20/2018 9.71  4/21/2021 9.24  9/18/2021 6.88  12/8/2021 8.44 (bx# 2, 3+4=7)  11//11/22 11.7  7/21/23 10.7      PSA DT: flat    Life Expectancy: 18 years    Jimena Risks: Organ confined 52%, extraprostatic extension 34%, seminal vesicle invasion 8%, lymph node involvement 5%    Past Medical History:   Past Medical History:   Diagnosis Date    Essential hypertension with goal blood pressure less than 140/90 2011    Hyperlipidemia     Prostate cancer (H) 03/23/2018       Past Surgical History:   Past Surgical History:   Procedure Laterality Date    PROSTATE BIOPSY  03/23/2018    PROSTATE BIOPSY  04/05/2022       Chemotherapy History:  No prior chemotherapy    Radiation History:  No prior radiation    Pregnant: No  Implanted Cardiac Devices: No    Medications:  Current Outpatient Medications   Medication    amLODIPine (NORVASC) 5 MG tablet    atorvastatin (LIPITOR) 20 MG tablet    IBUPROFEN PO     No current facility-administered medications for this visit.         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 Pertinent findings are noted in the HPI.    Physical Exam   ECOG  Status: 1    Vitals:  /84 (BP Location: Left arm, Patient Position: Chair, Cuff Size: Adult Regular)   Pulse 60   Temp 97.8  F (36.6  C) (Oral)   Resp 18   Wt 81.1 kg (178 lb 11.2 oz)   SpO2 96%   BMI 26.78 kg/m      Gen: Alert, in NAD  Head: NC/AT  Eyes: PERRL, EOMI, sclera anicteric  Ears: No external auricular lesions  Nose/sinus: No rhinorrhea or epistaxis  Oral cavity/oropharynx: MMM, no visible oral cavity lesions  Neck: Full ROM, supple, no palpable adenopathy  Pulm: No wheezing, stridor or respiratory distress  CV: Extremities are warm and well-perfused, no cyanosis, no pedal edema  Abdominal: Normal bowel sounds, soft, nontender, no masses  : deferred   Musculoskeletal: Normal bulk and tone  Skin: Normal color and turgor  Neuro: A/Ox3, CN II-XII intact, normal gait    Imaging/Path/Labs   Imaging: per HPI, personally reviewed and in agreement     Path:     A.  Prostate, left, needle biopsy:  -Prostatic adenocarcinoma, acinar type, Kt grade 3+4 = 7, grade group is 2, number of cores involved is 2 of 7, total surface area involved is 10%, percent of grade 4 is 1%, perineural invasion is identified.  -High-grade prostatic intraepithelial neoplasia (HGPIN).     B.  Prostate, right, needle biopsy:  -Prostatic adenocarcinoma, acinar type, Kt grade 3+3 = 6, grade group is 1, number of cores involved is 1 of 7, total surface area involved is 5%, perineural invasion is  not identified.  -High-grade prostatic intraepithelial neoplasia (HGPIN).  -See comment.    Labs: per HPI, personally reviewed and in agreement     Assessment      Mr. Eason is a 66 year old male who was is on active surveillance for low risk prostate cancer ( cT1c,PSA 6.99, GS 3+3=6, 2018) now presenting with progression to unfavorable, intermediate risk prostate cancer (cT1c, PSA 11.7, GS 3+4=7 (3 cores positive with 1% of kt 4).     In general, we discussed the management of unfavorable intermediate risk prostate  cancer per NCCN guidelines.  In patients with a life expectancy greater than 10 years, options include external beam with ST-ADT (4-6 months) or radical prostatectomy with pelvic lymph node dissection.     We discussed options of radiation particularly external beam radiation.  We discussed treating patient with radiation alone with a hypofractionated approach (preferred) with 70Gy/28 fractions.  Given that his lymph node risk is 5%,  target would only be the prostate gland and proximal seminal vesicles without treating the lymph nodes.  Given that patient is intermediate unfavorable risk, ST-ADT (4-6 months)  is recommended.    Further, we discussed that in general ADT offers a disease free survival and overall survival benefit in intermediate unfavorable and high risk prostate cancer patients (EORTC 06787 Trial, DART Trial). We discussed the a potential added cardiovascular risk with ADT, potentially greatest in older patients and/or in patients significant pre-existing cardiovascular risks (D'Amico et al. JCO 2007). However, this remains controversial and there is data to support no increased cardiovascular death risk with ADT (Sarmiento et al., SAQIB 2011). Thus, the use of ADT must be tailored to individual patient's comorbidities.    We also discussed the general side effects and differences between radiation and surgery:     Side Effects of Radiation Therapy   The main side effects of radiation therapy for prostate cancer are irritation/injury to the rectum/bladder and erectile dysfunction.  Radiation side effects, similar to surgery, are age dependent.  Furthermore, radiation side effects are dependent on the area being treated, the total dose of radiation, and whether or not additional treatment (hormone therapy, previous surgery, etc.) is/was given.     Side effects of external beam radiation may include diarrhea and colitis (irritated intestines).  Occasionally, normal bowel function does not return after  treatment is stopped.  Both during and after treatment, other side effects may include frequent urination, urge incontinence (feeling like you have to urinate all the time), burning sensation while urinating, and blood in the urine.  Less than 5% of men report problems with urinary incontinence, but this may increase over time as the effects of radiation can increase.     Radiation therapy may also cause a feeling of fatigue, which may persist for a few months after treatment stops.  About 30% to 60% of men who receive external beam radiation develop impotence.  Impotence usually does not occur right after radiation therapy but gradually develops over one or more years.  Eventually, the rate of impotence may equal that of surgery (Patti PROST-QA, [facility] 2008; Sridhar, ProtecT Trial QOL, [facility] 2016).     Side Effects of Prostate Surgery   The main side effects of radical prostatectomy are incontinence and impotence and are both age dependent as well as type of surgery performed.     Normal bladder control usually returns within several weeks or months after radical prostatectomy.  Passing a small amount of urine, when coughing, laughing, sneezing, or exercising, may persist permanently after prostatectomy in up to 35% of men.  Some patients (between 2% and 5%) have more serious stress incontinence, which may be permanent.     During the first 3 to 12 months after radical prostatectomy, most men will have erectile dysfunction and will need to use medicines or other treatments if they wish to have an erection.  The effect of this operation on a man's ability to achieve an erection is related to the patient's age and whether nerve-sparing surgery was done.  Nearly all men who have a radical prostatectomy should expect some permanent decrease in their ability to have an erection, but younger men may expect to retain more of their ability.  If the surgeon does not remove the nerves on either side of the prostate during  prostatectomy, the impotence rate is between 25% and 30% for men under 60.  But it occurs in 70% to 80% of men over 70, even if nerves on both sides are not removed.  By contrast, after standard radical prostatectomy (in which the nerves are removed), almost all men will become impotent, depending on their age.  Side effects are reported less often among men treated at [facility] cancer centers, where this type of surgery is performed on a more routine basis.  Specific questions/concerns were referred back to the urology oncology team to address further (Patti PROST-QA, [facility] 2008; Sridhar, ProtecT Trial QOL, [facility] 2016).      Plan     After extensive discussion, patient wishes to pursue radiation and ST ADT. Tentative plan will be 70Gy/28fx to prostate and proximal seminal vesicles.     2. Before finalizing plan, I will complete stating with MRI pelvis and bone scan and have patient to follow up with results.    3. If negative, plan for ADT (ST) followed by fiducial placement 2 months later, with CT simulation scheduled 10-14 after fiducial placement.     All benefits and risks discussed, and patient is in agreement with the oncologic plan discussed above.     Thank you for allowing me to participate in your patient's care.  If you should require any additional information, please do not hesitate in contacting me.     Yuniel Escobar MD  Department of Radiation Oncology  [facility]

## 2023-09-15 NOTE — PATIENT INSTRUCTIONS
What to expect at your Simulation visit:    You will meet with a Radiation Therapist and other team members who will be doing a planning session called a  simulation  with you. This process will determine your daily treatment.    ~ You will lie on a flat table and have a treatment planning CT scan.  It is important during the scan to hold very still and breathe normally.    ~ Your therapist may construct a body mold to help you hold still for your treatments.    ~ If you are having treatment to the head or neck area you will be fitted with a plastic mesh mask that fits very snugly over your face and neck.     ~ Your therapist will be taking some digital photos that will go in your treatment chart.      ~Your therapist will make marks on your skin and take measurements. Your therapist may ask you about making small tattoos (a permanent small dot) over these marks.  These marks are used to position you daily for your radiation therapy treatments. Please do not wash off any marks until all of your radiation therapy treatments are complete unless you are instructed to do so by your therapist.    ~ Once the simulation is completed it can take from 3 to 10 business days before you start radiation therapy treatments.    ~ You may meet with a nurse who will go over management of treatment side effects and self care during your treatments. The nurse will help to plan care with other departments and physicians if needed.     Please contact [facility] RN with questions or concerns following today's appointment: [phone].       Please feel free to leave a detailed message if your call is not answered.    If your call is not received before 3:00 PM, it may not be returned until the following business day.    If you are receiving radiation treatment and need assistance after 3:00 PM or on the weekends, please call [phone] and ask to speak to the radiation oncologist on-call.    Thank  you!    Kassandra TURNER

## 2023-09-15 NOTE — NURSING NOTE
INITIAL PATIENT ASSESSMENT      Diagnosis: prostate cancer    Prior radiation therapy: None    Prior chemotherapy: None    Prior hormonal therapy: No    Pain Eval:  Denies    Psychosocial  Living arrangements: lives at home in [place], presents to clinic with his wife  Fall Risk: independent  [facility] Falls Risk Screening Completed: Yes Result: Negative   referral needs: Not needed    Advanced Directive: No, patient declined information packet  Implantable Cardiac Device: No  Authorization To Share Protected Health Information: YES - Date: 6/21/2022      Reproductive note: not recorded for male patient.    Review of Systems     Constitutional: Negative.    HENT: Negative.     Eyes: Negative.    Respiratory: Negative.     Cardiovascular: Negative.    Gastrointestinal: Negative.    Genitourinary: Negative.    Musculoskeletal: Negative.    Skin: Negative.    Neurological: Negative.    Endo/Heme/Allergies: Negative.    Psychiatric/Behavioral: Negative.       Nurse face-to-face time: Level 5:  over 15 min face to face time.    Kassandra Monaco RN BSN OCN CBCN

## 2023-09-15 NOTE — LETTER
9/15/2023         RE: Jose Alejandro Eason  [address] [place] [address]        Dear Colleague,    Thank you for referring your patient, Jose Alejandro Eason, to the [facility] RADIATION [facility]COLOG[facility] [place]. Please see a copy of my visit note below.       Department of Radiation Oncology  [facility]: Cancer Center  [facility]  [address] [address] [place] [address]  [phone]       Consultation Note    Name: Jose Alejandro Eason MRN: [id number]   : 1955   Date of Service: Sep 15, 2023   Referring: Dr. Galindo     Reason for consultation: prostate cancer    History of Present Illness     Mr. Eason is a 66 year old male who was is on active surveillance for low risk prostate cancer ( cT1c,PSA 6.99, GS 3+3=6, 2018) now presenting with progression to unfavorable, intermediate risk prostate cancer (cT1c, PSA 11.7, GS 3+4=7 (3 cores positive with 1% of kt 4).      Patient was initially noted to have an elevated PSA in 2017 to a value of 6.99.  He subsequently underwent biopsy in 3/23/2018 with pathology returning as a Kt 3+3 = 6 in 3/6 cores.  MRI in 2018 was negative for any lymphadenopathy, and did not identify any abnormal lesions with a PI-RADS 2 categorization.  At that time he opted for active surveillance.    His PSA has bounced around from 9 to a value of 8.44 on 2021.  MRI was obtained on 2022 demonstrating a PI-RADS 2 categorization, without evidence of lymphadenopathy, essentially unchanged from 2018.  Biopsy was obtained on 2022 with pathology returning as a [place] 3+ 4 = 7 in 2/7 cores, with a total surface involvement of 10% on the left, with 1% of grade 4 disease. On the right, pathology returned as 3+3=6 in 1/7 cores, with 5% of total involved.JOSEPHINE was unremarkable.    Patient was seen in radiation clinic over a year ago and at that point he opted for active surveillance.  He has been following with   Josie.  PSA campos to a value of 11.7 on November 2022 and most recently to 10.7 in July 21, 2023.  Patient is now decided he wishes to pursue treatment.  He discussed surgical options with Dr. Galindo but prefers not surgical management.    The patient presents today to discuss the potential role of radiation therapy in treatment of his prostate cancer.    Symptoms:  Utility score: 80  Continence: 0 pads used per day with a bother score of 1  Potency/erectile function score: 4 with a bother score of 1  IPSS score: 7/35  ALTON score:  19/25    PSA Levels:    Date  PSA    12/10/2017 6.99 (bx #1, 3+3=6, chose active surveillance)  9/20/2018 9.71  4/21/2021 9.24  9/18/2021 6.88  12/8/2021 8.44 (bx# 2, 3+4=7)  11//11/22 11.7  7/21/23 10.7      PSA DT: flat    Life Expectancy: 18 years    Jimena Risks: Organ confined 52%, extraprostatic extension 34%, seminal vesicle invasion 8%, lymph node involvement 5%    Past Medical History:   Past Medical History:   Diagnosis Date     Essential hypertension with goal blood pressure less than 140/90 2011     Hyperlipidemia      Prostate cancer (H) 03/23/2018       Past Surgical History:   Past Surgical History:   Procedure Laterality Date     PROSTATE BIOPSY  03/23/2018     PROSTATE BIOPSY  04/05/2022       Chemotherapy History:  No prior chemotherapy    Radiation History:  No prior radiation    Pregnant: No  Implanted Cardiac Devices: No    Medications:  Current Outpatient Medications   Medication     amLODIPine (NORVASC) 5 MG tablet     atorvastatin (LIPITOR) 20 MG tablet     IBUPROFEN PO     No current facility-administered medications for this visit.         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 Pertinent findings are noted in the HPI.    Physical Exam   ECOG Status: 1    Vitals:  /84 (BP Location: Left arm, Patient Position: Chair, Cuff Size: Adult Regular)   Pulse 60   Temp 97.8  F (36.6  C) (Oral)   Resp 18   Wt 81.1 kg (178 lb 11.2 oz)   SpO2 96%   BMI 26.78 kg/m      Gen: Alert, in NAD  Head: NC/AT  Eyes: PERRL, EOMI, sclera anicteric  Ears: No external auricular lesions  Nose/sinus: No rhinorrhea or epistaxis  Oral cavity/oropharynx: MMM, no visible oral cavity lesions  Neck: Full ROM, supple, no palpable adenopathy  Pulm: No wheezing, stridor or respiratory distress  CV: Extremities are warm and well-perfused, no cyanosis, no pedal edema  Abdominal: Normal bowel sounds, soft, nontender, no masses  : deferred   Musculoskeletal: Normal bulk and tone  Skin: Normal color and turgor  Neuro: A/Ox3, CN II-XII intact, normal gait    Imaging/Path/Labs   Imaging: per HPI, personally reviewed and in agreement     Path:     A.  Prostate, left, needle biopsy:  -Prostatic adenocarcinoma, acinar type, [place] grade 3+4 = 7, grade group is 2, number of cores involved is 2 of 7, total surface area involved is 10%, percent of grade 4 is 1%, perineural invasion is identified.  -High-grade prostatic intraepithelial neoplasia (HGPIN).     B.  Prostate, right, needle biopsy:  -Prostatic adenocarcinoma, acinar type, [place] grade 3+3 = 6, grade group is 1, number of cores involved is 1 of 7, total surface area involved is 5%, perineural invasion is  not identified.  -High-grade prostatic intraepithelial neoplasia (HGPIN).  -See comment.    Labs: per HPI, personally reviewed and in agreement     Assessment      Mr. Eason is a 66 year old male who was is on active surveillance for low risk prostate cancer (  cT1c,PSA 6.99, GS 3+3=6, 2018) now presenting with progression to unfavorable, intermediate risk prostate cancer (cT1c, PSA 11.7, GS 3+4=7 (3 cores positive with 1% of kt 4).     In general, we discussed the management of unfavorable intermediate risk prostate cancer per NCCN guidelines.  In patients with a life expectancy greater than 10 years, options include external beam with ST-ADT (4-6 months) or radical prostatectomy with pelvic lymph node dissection.     We discussed options of radiation particularly external beam radiation.  We discussed treating patient with radiation alone with a hypofractionated approach (preferred) with 70Gy/28 fractions.  Given that his lymph node risk is 5%,  target would only be the prostate gland and proximal seminal vesicles without treating the lymph nodes.  Given that patient is intermediate unfavorable risk, ST-ADT (4-6 months)  is recommended.    Further, we discussed that in general ADT offers a disease free survival and overall survival benefit in intermediate unfavorable and high risk prostate cancer patients (EORTC 24772 Trial, DART Trial). We discussed the a potential added cardiovascular risk with ADT, potentially greatest in older patients and/or in patients significant pre-existing cardiovascular risks (D'Amico et al. JCO 2007). However, this remains controversial and there is data to support no increased cardiovascular death risk with ADT (Sarmiento et al., SAQIB 2011). Thus, the use of ADT must be tailored to individual patient's comorbidities.    We also discussed the general side effects and differences between radiation and surgery:     Side Effects of Radiation Therapy   The main side effects of radiation therapy for prostate cancer are irritation/injury to the rectum/bladder and erectile dysfunction.  Radiation side effects, similar to surgery, are age dependent.  Furthermore, radiation side effects are dependent on the area being treated, the total dose of  radiation, and whether or not additional treatment (hormone therapy, previous surgery, etc.) is/was given.     Side effects of external beam radiation may include diarrhea and colitis (irritated intestines).  Occasionally, normal bowel function does not return after treatment is stopped.  Both during and after treatment, other side effects may include frequent urination, urge incontinence (feeling like you have to urinate all the time), burning sensation while urinating, and blood in the urine.  Less than 5% of men report problems with urinary incontinence, but this may increase over time as the effects of radiation can increase.     Radiation therapy may also cause a feeling of fatigue, which may persist for a few months after treatment stops.  About 30% to 60% of men who receive external beam radiation develop impotence.  Impotence usually does not occur right after radiation therapy but gradually develops over one or more years.  Eventually, the rate of impotence may equal that of surgery (Patti PROST-QA, [facility] 2008; Sridhar, ProtecT Trial QOL, [facility] 2016).     Side Effects of Prostate Surgery   The main side effects of radical prostatectomy are incontinence and impotence and are both age dependent as well as type of surgery performed.     Normal bladder control usually returns within several weeks or months after radical prostatectomy.  Passing a small amount of urine, when coughing, laughing, sneezing, or exercising, may persist permanently after prostatectomy in up to 35% of men.  Some patients (between 2% and 5%) have more serious stress incontinence, which may be permanent.     During the first 3 to 12 months after radical prostatectomy, most men will have erectile dysfunction and will need to use medicines or other treatments if they wish to have an erection.  The effect of this operation on a man's ability to achieve an erection is related to the patient's age and whether nerve-sparing surgery was done.   Nearly all men who have a radical prostatectomy should expect some permanent decrease in their ability to have an erection, but younger men may expect to retain more of their ability.  If the surgeon does not remove the nerves on either side of the prostate during prostatectomy, the impotence rate is between 25% and 30% for men under 60.  But it occurs in 70% to 80% of men over 70, even if nerves on both sides are not removed.  By contrast, after standard radical prostatectomy (in which the nerves are removed), almost all men will become impotent, depending on their age.  Side effects are reported less often among men treated at [facility], where this type of surgery is performed on a more routine basis.  Specific questions/concerns were referred back to the urology oncology team to address further (Patti PROST-QA, [facility] 2008; Sridhar, ProtecT Trial QOL, [facility] 2016).      Plan     After extensive discussion, patient wishes to pursue radiation and ST ADT. Tentative plan will be 70Gy/28fx to prostate and proximal seminal vesicles.     2. Before finalizing plan, I will complete stating with MRI pelvis and bone scan and have patient to follow up with results.    3. If negative, plan for ADT (ST) followed by fiducial placement 2 months later, with CT simulation scheduled 10-14 after fiducial placement.     All benefits and risks discussed, and patient is in agreement with the oncologic plan discussed above.     Thank you for allowing me to participate in your patient's care.  If you should require any additional information, please do not hesitate in contacting me.     Yuniel Escobar MD  Department of Radiation Oncology  [facility]       Again, thank you for allowing me to participate in the care of your patient.        Sincerely,        Yuniel Escobar MD

## 2023-10-13 ENCOUNTER — ANCILLARY PROCEDURE (OUTPATIENT)
Dept: NUCLEAR MEDICINE | Facility: CLINIC | Age: 68
End: 2023-10-13
Attending: SURGERY
Payer: COMMERCIAL

## 2023-10-13 DIAGNOSIS — C61 PROSTATE CANCER (H): ICD-10-CM

## 2023-10-13 PROCEDURE — A9503 TC99M MEDRONATE: HCPCS | Performed by: RADIOLOGY

## 2023-10-13 PROCEDURE — 78306 BONE IMAGING WHOLE BODY: CPT | Performed by: RADIOLOGY

## 2023-10-13 RX ORDER — TC 99M MEDRONATE 20 MG/10ML
20-30 INJECTION, POWDER, LYOPHILIZED, FOR SOLUTION INTRAVENOUS ONCE
Status: COMPLETED | OUTPATIENT
Start: 2023-10-13 | End: 2023-10-13

## 2023-10-13 RX ADMIN — TC 99M MEDRONATE 25.9 MILLICURIE: 20 INJECTION, POWDER, LYOPHILIZED, FOR SOLUTION INTRAVENOUS at 11:39

## 2023-11-08 ENCOUNTER — HOSPITAL ENCOUNTER (OUTPATIENT)
Dept: MRI IMAGING | Facility: CLINIC | Age: 68
Discharge: HOME | End: 2023-11-08
Attending: SURGERY | Admitting: SURGERY
Payer: COMMERCIAL

## 2023-11-08 DIAGNOSIS — C61 PROSTATE CANCER (H): ICD-10-CM

## 2023-11-08 PROCEDURE — 255N000002 HC RX 255 OP 636: Performed by: SURGERY

## 2023-11-08 PROCEDURE — 72197 MRI PELVIS W/O & W/DYE: CPT

## 2023-11-08 PROCEDURE — A9585 GADOBUTROL INJECTION: HCPCS | Performed by: SURGERY

## 2023-11-08 PROCEDURE — 72197 MRI PELVIS W/O & W/DYE: CPT | Mod: 26 | Performed by: RADIOLOGY

## 2023-11-08 RX ORDER — GADOBUTROL 604.72 MG/ML
10 INJECTION INTRAVENOUS ONCE
Status: COMPLETED | OUTPATIENT
Start: 2023-11-08 | End: 2023-11-08

## 2023-11-08 RX ADMIN — GADOBUTROL 10 ML: 604.72 INJECTION INTRAVENOUS at 10:28

## 2023-11-10 ENCOUNTER — OFFICE VISIT (OUTPATIENT)
Dept: RADIATION ONCOLOGY | Facility: CLINIC | Age: 68
End: 2023-11-10
Payer: COMMERCIAL

## 2023-11-10 VITALS
RESPIRATION RATE: 18 BRPM | WEIGHT: 178 LBS | HEART RATE: 55 BPM | DIASTOLIC BLOOD PRESSURE: 87 MMHG | OXYGEN SATURATION: 97 % | SYSTOLIC BLOOD PRESSURE: 143 MMHG | BODY MASS INDEX: 26.67 KG/M2 | TEMPERATURE: 98.3 F

## 2023-11-10 DIAGNOSIS — C61 PROSTATE CANCER (H): Primary | ICD-10-CM

## 2023-11-10 PROCEDURE — 99213 OFFICE O/P EST LOW 20 MIN: CPT | Performed by: SURGERY

## 2023-11-10 ASSESSMENT — PAIN SCALES - GENERAL: PAINLEVEL: NO PAIN (0)

## 2023-11-10 NOTE — PATIENT INSTRUCTIONS
What to expect at your Simulation visit:    You will meet with a Radiation Therapist and other team members who will be doing a planning session called a  simulation  with you. This process will determine your daily treatment.    ~ You will lie on a flat table and have a treatment planning CT scan.  It is important during the scan to hold very still and breathe normally.    ~ Your therapist may construct a body mold to help you hold still for your treatments.    ~ If you are having treatment to the head or neck area you will be fitted with a plastic mesh mask that fits very snugly over your face and neck.     ~ Your therapist will be taking some digital photos that will go in your treatment chart.      ~Your therapist will make marks on your skin and take measurements. Your therapist may ask you about making small tattoos (a permanent small dot) over these marks.  These marks are used to position you daily for your radiation therapy treatments. Please do not wash off any marks until all of your radiation therapy treatments are complete unless you are instructed to do so by your therapist.    ~ Once the simulation is completed it can take from 3 to 10 business days before you start radiation therapy treatments.    ~ You may meet with a nurse who will go over management of treatment side effects and self care during your treatments. The nurse will help to plan care with other departments and physicians if needed.       ease contact [facility] RN with questions or concerns following today's appointment: [phone].       Please feel free to leave a detailed message if your call is not answered.    If your call is not received before 3:00 PM, it may not be returned until the following business day.    If you are receiving radiation treatment and need assistance after 3:00 PM or on the weekends, please call [phone] and ask to speak to the radiation oncologist on-call.    Thank  you!    Kassandra TURNER

## 2023-11-10 NOTE — LETTER
11/10/2023         RE: Jose Alejandro Eason  [address] [facility] [address]        Dear Colleague,    Thank you for referring your patient, Jose Alejandro Eason, to the [facility] RADIATION ONCOLOGY [place]. Please see a copy of my visit note below.       Department of Radiation Oncology  [facility]  [facility]  [address] [address] [place] [id number]  [phone]       Consultation Note    Name: Jose Alejandro Eason MRN: [id number]   : 1955   Date of Service: Nov 10, 2023   Referring: Dr. Galindo     Reason for consultation: prostate cancer    History of Present Illness     Mr. Eason is a 68 year old male who was is on active surveillance for low risk prostate cancer ( cT1c,PSA 6.99, GS 3+3=6, 2018) now presenting with progression to unfavorable, intermediate risk prostate cancer (cT1c, PSA 11.7, GS 3+4=7 (3 cores positive with 1% of kt 4).      Patient was initially noted to have an elevated PSA in 2017 to a value of 6.99.  He subsequently underwent biopsy in 3/23/2018 with pathology returning as a Kt 3+3 = 6 in 3/6 cores.  MRI in 2018 was negative for any lymphadenopathy, and did not identify any abnormal lesions with a PI-RADS 2 categorization.  At that time he opted for active surveillance.    His PSA has bounced around from 9 to a value of 8.44 on 2021.  MRI was obtained on 2022 demonstrating a PI-RADS 2 categorization, without evidence of lymphadenopathy, essentially unchanged from 2018.  Biopsy was obtained on 2022 with pathology returning as a Echo 3+ 4 = 7 in 2/7 cores, with a total surface involvement of 10% on the left, with 1% of grade 4 disease. On the right, pathology returned as 3+3=6 in 1/7 cores, with 5% of total involved.JOSEPHINE was unremarkable.    Patient was seen in radiation clinic over a year ago and at that point he opted for active surveillance.  He has been following with  Dr. Galindo.  PSA campos to a value of 11.7 on November 2022 and most recently to 10.7 in July 21, 2023.  Patient is now decided he wishes to pursue treatment.  He discussed surgical options with Dr. Galindo but prefers not surgical management.    The patient presents today to discuss the potential role of radiation therapy in treatment of his prostate cancer.    Interval history:    Patient underwent MRI did not reveal any LAD. Bone scan  did not reveal any distant metastasis. No new GI/ symptoms.     Symptoms:  Utility score: 80  Continence: 0 pads used per day with a bother score of 1  Potency/erectile function score: 4 with a bother score of 1  IPSS score: 7/35  ALTON score:  19/25    PSA Levels:    Date  PSA    12/10/2017 6.99 (bx #1, 3+3=6, chose active surveillance)  9/20/2018 9.71  4/21/2021 9.24  9/18/2021 6.88  12/8/2021 8.44 (bx# 2, 3+4=7)  11//11/22 11.7  7/21/23 10.7      PSA DT: flat    Life Expectancy: 18 years    Jimena Risks: Organ confined 52%, extraprostatic extension 34%, seminal vesicle invasion 8%, lymph node involvement 5%    Past Medical History:   Past Medical History:   Diagnosis Date     Essential hypertension with goal blood pressure less than 140/90 2011     Hyperlipidemia      Prostate cancer (H) 03/23/2018       Past Surgical History:   Past Surgical History:   Procedure Laterality Date     PROSTATE BIOPSY  03/23/2018     PROSTATE BIOPSY  04/05/2022       Chemotherapy History:  No prior chemotherapy    Radiation History:  No prior radiation    Pregnant: No  Implanted Cardiac Devices: No    Medications:  Current Outpatient Medications   Medication     amLODIPine (NORVASC) 5 MG tablet     atorvastatin (LIPITOR) 20 MG tablet     IBUPROFEN PO     No current facility-administered medications for this visit.         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 Pertinent findings are noted in the HPI.    Physical Exam   ECOG Status: 1    Vitals:  BP (!) 143/87 (BP Location: Left arm, Patient Position: Chair, Cuff Size: Adult Regular)   Pulse 55   Temp 98.3  F (36.8  C) (Oral)   Resp 18   Wt 80.7 kg (178 lb)   SpO2 97%   BMI 26.67 kg/m      Gen: Alert, in NAD  Head: NC/AT  Eyes: PERRL, EOMI, sclera anicteric  Ears: No external auricular lesions  Nose/sinus: No rhinorrhea or epistaxis  Oral cavity/oropharynx: MMM, no visible oral cavity lesions  Neck: Full ROM, supple, no palpable adenopathy  Pulm: No wheezing, stridor or respiratory distress  CV: Extremities are warm and well-perfused, no cyanosis, no pedal edema  Abdominal: Normal bowel sounds, soft, nontender, no masses  : deferred   Musculoskeletal: Normal bulk and tone  Skin: Normal color and turgor  Neuro: A/Ox3, CN II-XII intact, normal gait    Imaging/Path/Labs   Imaging: per HPI, personally reviewed and in agreement     Path:     A.  Prostate, left, needle biopsy:  -Prostatic adenocarcinoma, acinar type, [place] grade 3+4 = 7, grade group is 2, number of cores involved is 2 of 7, total surface area involved is 10%, percent of grade 4 is 1%, perineural invasion is identified.  -High-grade prostatic intraepithelial neoplasia (HGPIN).     B.  Prostate, right, needle biopsy:  -Prostatic adenocarcinoma, acinar type, [place] grade 3+3 = 6, grade group is 1, number of cores involved is 1 of 7, total surface area involved is 5%, perineural invasion is  not identified.  -High-grade prostatic intraepithelial neoplasia (HGPIN).  -See comment.    Labs: per HPI,  personally reviewed and in agreement     Assessment      Mr. Eason is a 66 year old male who was is on active surveillance for low risk prostate cancer ( cT1c,PSA 6.99, GS 3+3=6, 2018) now presenting with progression to unfavorable, intermediate risk prostate cancer (cT1c, PSA 11.7, GS 3+4=7 (3 cores positive with 1% of kt 4). Metastatic work up is negative for LAD or distant disease.     In general, we discussed the management of unfavorable intermediate risk prostate cancer per NCCN guidelines.  In patients with a life expectancy greater than 10 years, options include external beam with ST-ADT (4-6 months) or radical prostatectomy with pelvic lymph node dissection.     We discussed options of radiation particularly external beam radiation.  We discussed treating patient with radiation alone with a hypofractionated approach (preferred) with 70Gy/28 fractions.  Given that his lymph node risk is 5%,  target would only be the prostate gland and proximal seminal vesicles without treating the lymph nodes.  Given that patient is intermediate unfavorable risk, ST-ADT (4-6 months)  is recommended.    Further, we discussed that in general ADT offers a disease free survival and overall survival benefit in intermediate unfavorable and high risk prostate cancer patients (EORTC 67066 Trial, DART Trial). We discussed the a potential added cardiovascular risk with ADT, potentially greatest in older patients and/or in patients significant pre-existing cardiovascular risks (D'Amico et al. JCO 2007). However, this remains controversial and there is data to support no increased cardiovascular death risk with ADT (Sarmiento et al., SAQIB 2011). Thus, the use of ADT must be tailored to individual patient's comorbidities.    We also discussed the general side effects and differences between radiation and surgery:     Side Effects of Radiation Therapy   The main side effects of radiation therapy for prostate cancer are  irritation/injury to the rectum/bladder and erectile dysfunction.  Radiation side effects, similar to surgery, are age dependent.  Furthermore, radiation side effects are dependent on the area being treated, the total dose of radiation, and whether or not additional treatment (hormone therapy, previous surgery, etc.) is/was given.     Side effects of external beam radiation may include diarrhea and colitis (irritated intestines).  Occasionally, normal bowel function does not return after treatment is stopped.  Both during and after treatment, other side effects may include frequent urination, urge incontinence (feeling like you have to urinate all the time), burning sensation while urinating, and blood in the urine.  Less than 5% of men report problems with urinary incontinence, but this may increase over time as the effects of radiation can increase.     Radiation therapy may also cause a feeling of fatigue, which may persist for a few months after treatment stops.  About 30% to 60% of men who receive external beam radiation develop impotence.  Impotence usually does not occur right after radiation therapy but gradually develops over one or more years.  Eventually, the rate of impotence may equal that of surgery (Patti PROST-QA, [facility] 2008; Sridhar, ProtecT Trial QOL, [place] 2016).     Side Effects of Prostate Surgery   The main side effects of radical prostatectomy are incontinence and impotence and are both age dependent as well as type of surgery performed.     Normal bladder control usually returns within several weeks or months after radical prostatectomy.  Passing a small amount of urine, when coughing, laughing, sneezing, or exercising, may persist permanently after prostatectomy in up to 35% of men.  Some patients (between 2% and 5%) have more serious stress incontinence, which may be permanent.     During the first 3 to 12 months after radical prostatectomy, most men will have erectile dysfunction and will  need to use medicines or other treatments if they wish to have an erection.  The effect of this operation on a man's ability to achieve an erection is related to the patient's age and whether nerve-sparing surgery was done.  Nearly all men who have a radical prostatectomy should expect some permanent decrease in their ability to have an erection, but younger men may expect to retain more of their ability.  If the surgeon does not remove the nerves on either side of the prostate during prostatectomy, the impotence rate is between 25% and 30% for men under 60.  But it occurs in 70% to 80% of men over 70, even if nerves on both sides are not removed.  By contrast, after standard radical prostatectomy (in which the nerves are removed), almost all men will become impotent, depending on their age.  Side effects are reported less often among men treated at [facility] cancer centers, where this type of surgery is performed on a more routine basis.  Specific questions/concerns were referred back to the urology oncology team to address further (Patti PROST-QA, [facility] 2008; Sridhar, ProtecT Trial QOL, [facility] 2016).      Plan     After extensive discussion, patient wishes to pursue radiation and ST ADT. Plan will be 70Gy/28fx to prostate and proximal seminal vesicles.     Plan for ADT (ST) followed by fiducial placement 2 months later, with CT simulation scheduled 10-14 after fiducial placement. I will reach out to Dr. Galindo's office for this.    All benefits and risks discussed, and patient is in agreement with the oncologic plan discussed above.     Thank you for allowing me to participate in your patient's care.  If you should require any additional information, please do not hesitate in contacting me.     Yuniel Escobar MD  Department of Radiation Oncology  [facility]       Again, thank you for allowing me to participate in the care of your patient.        Sincerely,        Yuniel Escobar MD

## 2023-11-10 NOTE — NURSING NOTE
"Oncology Rooming Note    November 10, 2023 1:44 PM   Jose Alejandro Eason is a 68 year old male who presents for:    Chief Complaint   Patient presents with    Cancer     Radiation oncology return consultation with Dr. Yuniel Escobar     Initial Vitals: BP (!) 143/87 (BP Location: Left arm, Patient Position: Chair, Cuff Size: Adult Regular)   Pulse 55   Temp 98.3  F (36.8  C) (Oral)   Resp 18   Wt 80.7 kg (178 lb)   SpO2 97%   BMI 26.67 kg/m   Estimated body mass index is 26.67 kg/m  as calculated from the following:    Height as of 6/13/19: 1.74 m (5' 8.5\").    Weight as of this encounter: 80.7 kg (178 lb). Body surface area is 1.97 meters squared.  No Pain (0) Comment: Data Unavailable   No LMP for male patient.  Allergies reviewed: Yes  Medications reviewed: Yes    Medications: Medication refills not needed today.  Pharmacy name entered into [facility]: [facility] [id number] [address]    Clinical concerns: patient presents to clinic to review recent bone scan on 10/13/2023 and MRI pelvis on 11/8/2023, review radiation therapy and treatment plan.  Patient denies history of radiation therapy, denies cardiac pacemaker/ICD.    Dr. Yuniel Escobar was notified.      Kassandra Monaco, RN BSN OCN CBCN                "

## 2023-11-10 NOTE — PROGRESS NOTES
Department of Radiation Oncology  [facility]    Health: Cancer Center  [facility]  [address] [address] [place] [id number]  [phone]       Consultation Note    Name: Jose Alejandro Eason MRN: [id number]   : 1955   Date of Service: Nov 10, 2023   Referring: Dr. Galindo     Reason for consultation: prostate cancer    History of Present Illness     Mr. Eason is a 68 year old male who was is on active surveillance for low risk prostate cancer ( cT1c,PSA 6.99, GS 3+3=6, 2018) now presenting with progression to unfavorable, intermediate risk prostate cancer (cT1c, PSA 11.7, GS 3+4=7 (3 cores positive with 1% of kt 4).      Patient was initially noted to have an elevated PSA in 2017 to a value of 6.99.  He subsequently underwent biopsy in 3/23/2018 with pathology returning as a Shawnee 3+3 = 6 in 3/6 cores.  MRI in 2018 was negative for any lymphadenopathy, and did not identify any abnormal lesions with a PI-RADS 2 categorization.  At that time he opted for active surveillance.    His PSA has bounced around from 9 to a value of 8.44 on 2021.  MRI was obtained on 2022 demonstrating a PI-RADS 2 categorization, without evidence of lymphadenopathy, essentially unchanged from 2018.  Biopsy was obtained on 2022 with pathology returning as a Shawnee 3+ 4 = 7 in 2/7 cores, with a total surface involvement of 10% on the left, with 1% of grade 4 disease. On the right, pathology returned as 3+3=6 in 1/7 cores, with 5% of total involved.JOSEPHINE was unremarkable.    Patient was seen in radiation clinic over a year ago and at that point he opted for active surveillance.  He has been following with Dr. Galindo.  PSA campos to a value of 11.7 on 2022 and most recently to 10.7 in 2023.  Patient is now decided he wishes to pursue treatment.  He discussed surgical options with Dr. Galindo but prefers not surgical management.    The patient presents  today to discuss the potential role of radiation therapy in treatment of his prostate cancer.    Interval history:    Patient underwent MRI did not reveal any LAD. Bone scan  did not reveal any distant metastasis. No new GI/ symptoms.     Symptoms:  Utility score: 80  Continence: 0 pads used per day with a bother score of 1  Potency/erectile function score: 4 with a bother score of 1  IPSS score: 7/35  ALTON score:  19/25    PSA Levels:    Date  PSA    12/10/2017 6.99 (bx #1, 3+3=6, chose active surveillance)  9/20/2018 9.71  4/21/2021 9.24  9/18/2021 6.88  12/8/2021 8.44 (bx# 2, 3+4=7)  11//11/22 11.7  7/21/23 10.7      PSA DT: flat    Life Expectancy: 18 years    Jimena Risks: Organ confined 52%, extraprostatic extension 34%, seminal vesicle invasion 8%, lymph node involvement 5%    Past Medical History:   Past Medical History:   Diagnosis Date    Essential hypertension with goal blood pressure less than 140/90 2011    Hyperlipidemia     Prostate cancer (H) 03/23/2018       Past Surgical History:   Past Surgical History:   Procedure Laterality Date    PROSTATE BIOPSY  03/23/2018    PROSTATE BIOPSY  04/05/2022       Chemotherapy History:  No prior chemotherapy    Radiation History:  No prior radiation    Pregnant: No  Implanted Cardiac Devices: No    Medications:  Current Outpatient Medications   Medication    amLODIPine (NORVASC) 5 MG tablet    atorvastatin (LIPITOR) 20 MG tablet    IBUPROFEN PO     No current facility-administered medications for this visit.         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 Pertinent findings are noted in the HPI.    Physical Exam   ECOG Status: 1    Vitals:  BP (!) 143/87 (BP Location: Left arm, Patient Position: Chair, Cuff Size: Adult Regular)   Pulse 55   Temp 98.3  F (36.8  C) (Oral)   Resp 18   Wt 80.7 kg (178 lb)   SpO2 97%   BMI 26.67 kg/m      Gen: Alert, in NAD  Head: NC/AT  Eyes: PERRL, EOMI, sclera anicteric  Ears: No external auricular lesions  Nose/sinus: No rhinorrhea or epistaxis  Oral cavity/oropharynx: MMM, no visible oral cavity lesions  Neck: Full ROM, supple, no palpable adenopathy  Pulm: No wheezing, stridor or respiratory distress  CV: Extremities are warm and well-perfused, no cyanosis, no pedal edema  Abdominal: Normal bowel sounds, soft, nontender, no masses  : deferred   Musculoskeletal: Normal bulk and tone  Skin: Normal color and turgor  Neuro: A/Ox3, CN II-XII intact, normal gait    Imaging/Path/Labs   Imaging: per HPI, personally reviewed and in agreement     Path:     A.  Prostate, left, needle biopsy:  -Prostatic adenocarcinoma, acinar type, Shelby grade 3+4 = 7, grade group is 2, number of cores involved is 2 of 7, total surface area involved is 10%, percent of grade 4 is 1%, perineural invasion is identified.  -High-grade prostatic intraepithelial neoplasia (HGPIN).     B.  Prostate, right, needle biopsy:  -Prostatic adenocarcinoma, acinar type, Shelby grade 3+3 = 6, grade group is 1, number of cores involved is 1 of 7, total surface area involved is 5%, perineural invasion is  not identified.  -High-grade prostatic intraepithelial neoplasia (HGPIN).  -See comment.    Labs: per HPI, personally reviewed and in agreement     Assessment      Mr. Eason is a 66 year old male who was is on active surveillance for low risk prostate cancer ( cT1c,PSA 6.99, GS 3+3=6, 2018) now presenting with progression to unfavorable, intermediate risk prostate cancer (cT1c, PSA 11.7,  GS 3+4=7 (3 cores positive with 1% of kt 4). Metastatic work up is negative for LAD or distant disease.     In general, we discussed the management of unfavorable intermediate risk prostate cancer per NCCN guidelines.  In patients with a life expectancy greater than 10 years, options include external beam with ST-ADT (4-6 months) or radical prostatectomy with pelvic lymph node dissection.     We discussed options of radiation particularly external beam radiation.  We discussed treating patient with radiation alone with a hypofractionated approach (preferred) with 70Gy/28 fractions.  Given that his lymph node risk is 5%,  target would only be the prostate gland and proximal seminal vesicles without treating the lymph nodes.  Given that patient is intermediate unfavorable risk, ST-ADT (4-6 months)  is recommended.    Further, we discussed that in general ADT offers a disease free survival and overall survival benefit in intermediate unfavorable and high risk prostate cancer patients (EORTC 25211 Trial, DART Trial). We discussed the a potential added cardiovascular risk with ADT, potentially greatest in older patients and/or in patients significant pre-existing cardiovascular risks (D'Amico et al. JCO 2007). However, this remains controversial and there is data to support no increased cardiovascular death risk with ADT (Sarmiento et al., SAQIB 2011). Thus, the use of ADT must be tailored to individual patient's comorbidities.    We also discussed the general side effects and differences between radiation and surgery:     Side Effects of Radiation Therapy   The main side effects of radiation therapy for prostate cancer are irritation/injury to the rectum/bladder and erectile dysfunction.  Radiation side effects, similar to surgery, are age dependent.  Furthermore, radiation side effects are dependent on the area being treated, the total dose of radiation, and whether or not additional treatment (hormone therapy,  previous surgery, etc.) is/was given.     Side effects of external beam radiation may include diarrhea and colitis (irritated intestines).  Occasionally, normal bowel function does not return after treatment is stopped.  Both during and after treatment, other side effects may include frequent urination, urge incontinence (feeling like you have to urinate all the time), burning sensation while urinating, and blood in the urine.  Less than 5% of men report problems with urinary incontinence, but this may increase over time as the effects of radiation can increase.     Radiation therapy may also cause a feeling of fatigue, which may persist for a few months after treatment stops.  About 30% to 60% of men who receive external beam radiation develop impotence.  Impotence usually does not occur right after radiation therapy but gradually develops over one or more years.  Eventually, the rate of impotence may equal that of surgery (Patti PROST-QA, [facility] 2008; Sridhar, ProtecT Trial QOL, [facility] 2016).     Side Effects of Prostate Surgery   The main side effects of radical prostatectomy are incontinence and impotence and are both age dependent as well as type of surgery performed.     Normal bladder control usually returns within several weeks or months after radical prostatectomy.  Passing a small amount of urine, when coughing, laughing, sneezing, or exercising, may persist permanently after prostatectomy in up to 35% of men.  Some patients (between 2% and 5%) have more serious stress incontinence, which may be permanent.     During the first 3 to 12 months after radical prostatectomy, most men will have erectile dysfunction and will need to use medicines or other treatments if they wish to have an erection.  The effect of this operation on a man's ability to achieve an erection is related to the patient's age and whether nerve-sparing surgery was done.  Nearly all men who have a radical prostatectomy should expect some  permanent decrease in their ability to have an erection, but younger men may expect to retain more of their ability.  If the surgeon does not remove the nerves on either side of the prostate during prostatectomy, the impotence rate is between 25% and 30% for men under 60.  But it occurs in 70% to 80% of men over 70, even if nerves on both sides are not removed.  By contrast, after standard radical prostatectomy (in which the nerves are removed), almost all men will become impotent, depending on their age.  Side effects are reported less often among men treated at [facility], where this type of surgery is performed on a more routine basis.  Specific questions/concerns were referred back to the urology oncology team to address further (Patti PROST-QA, [facility] 2008; Sridhar, ProtecT Trial QOL, [facility] 2016).      Plan     After extensive discussion, patient wishes to pursue radiation and ST ADT. Plan will be 70Gy/28fx to prostate and proximal seminal vesicles.     Plan for ADT (ST) followed by fiducial placement 2 months later, with CT simulation scheduled 10-14 after fiducial placement. I will reach out to Dr. Galindo's office for this.    All benefits and risks discussed, and patient is in agreement with the oncologic plan discussed above.     Thank you for allowing me to participate in your patient's care.  If you should require any additional information, please do not hesitate in contacting me.     Yuniel Escobar MD  Department of Radiation Oncology  [facility]

## 2023-11-15 ENCOUNTER — TELEPHONE (OUTPATIENT)
Dept: UROLOGY | Facility: CLINIC | Age: 68
End: 2023-11-15
Payer: COMMERCIAL

## 2023-11-15 DIAGNOSIS — C61 PROSTATE CANCER (H): Primary | ICD-10-CM

## 2023-11-15 RX ORDER — LEVOFLOXACIN 500 MG/1
500 TABLET, FILM COATED ORAL DAILY
Qty: 3 TABLET | Refills: 0 | Status: SHIPPED | OUTPATIENT
Start: 2023-11-15 | End: 2024-01-08

## 2023-11-15 NOTE — TELEPHONE ENCOUNTER
Writer called and spoke with patient. He states he is off work on 12/8 and is able to come in for Eligard.    Eligard was sent to [facility].    Pt is also scheduled for Gold seed placement on 1/26/24 with Dr. Galindo in [place].    A [employer] message was sent to patient for the gold seed. Info for gold seed will be reviewed with pt at his Nurse visit appt on 12/8/23.    ABX for pre- Gold seed ordered.    Diana MORROW RN   [facility], Urology   11/15/2023 3:23 PM

## 2023-11-15 NOTE — TELEPHONE ENCOUNTER
Eligard sent to [facility] for DOS 12/8/23.    Diana MORROW RN   [facility], Urology   11/15/2023 3:24 PM

## 2023-11-15 NOTE — TELEPHONE ENCOUNTER
----- Message from Kassandra Steinberg RN sent at 11/10/2023  2:03 PM CST -----  Regarding: Hormones + Fiducials  Good Afternoon!      Wondering if you could please help get patient scheduled for a hormone injection with Dr. Galindo and then 6 weeks later he will need fiducials placed.      Thank you so much!    Kassandra Monaco RN BSN OCN CBCN  Radiation Oncology Care Coordinator - [place]  [phone]

## 2023-11-25 ENCOUNTER — HEALTH MAINTENANCE LETTER (OUTPATIENT)
Age: 68
End: 2023-11-25

## 2023-12-08 ENCOUNTER — ALLIED HEALTH/NURSE VISIT (OUTPATIENT)
Dept: UROLOGY | Facility: CLINIC | Age: 68
End: 2023-12-08
Payer: COMMERCIAL

## 2023-12-08 DIAGNOSIS — C61 PROSTATE CANCER (H): Primary | ICD-10-CM

## 2023-12-08 PROCEDURE — 99207 PR NO CHARGE NURSE ONLY: CPT

## 2023-12-08 PROCEDURE — 96402 CHEMO HORMON ANTINEOPL SQ/IM: CPT | Performed by: UROLOGY

## 2023-12-08 NOTE — PATIENT INSTRUCTIONS
Prostate Ultrasound Instructions  Dr. Galindo  [address] [facility] [address]  [phone]    Appointment Date:   Time:     Take antibiotics as directed on label. START THE DAY OF THE procedure  1 Fleets enema to be done 1    - 2 hours before procedure   NOTHING BY MOUTH AFTER THE ENEMA   If you are taking blood thinners (Aspirin, ibuprofen, Aleve) this is to be stopped ONE WEEK before the procedure. Tylenol is ok. If you are taking Coumadin, you must check with your primary doctor for further instructions  Please follow up in the office with the doctor ONE WEEK after the procedure to go over results        Patient information:  You have had an examination of the prostate gland called a JOSEPHINE (digital rectal examination) where a finger was inserted into your rectum to enable the doctor to feel your prostate gland. You may also have had a PSA (prostate specific antigen) blood test.  Sometimes an elevated PSA level and an abnormal JOSEPHINE can be signs of prostate cancer. The doctor has recommended that you have a prostate biopsy.  GOLD SEED  You will be positioned for the GOLD SEED PLACEMENT as preferred by the physician.  An ultrasound probe is inserted into the rectum and a needle is then passed through the wall of the rectum and an injection of local anesthetic is given. Following the injection of local anesthetic, a needle will be inserted into the prostate gland to place the gold seeds.  The procedure will take approximately five to ten minutes.   What are the risks?  Infection: As there is a chance of infection we give you antibiotics before the procedure to reduce the risk. However is you experience a fever, chills, nausea, or just not feeling up to 72 hours after the biopsy you need to go directly to the emergency room.   Antibiotics: The physician will send a prescription to your preferred pharmacy.  We will call you once your culture results come back to let you that you can pick them up. You will follow the  directions on the bottle.  Sometimes 2 different antibiotics are sent. Start the antibiotics on THE DAY OF THE BIOPSY.   Bleeding: Some men will see blood in the urine, semen and stool.  Bleeding can last for 6-8 weeks intermittently.  If you take any medication such as aspirin, warfarin, clopidogrel then the risk of bleeding will be higher.   Retention: Some men are unable to pass urine after the biopsy. This is called urine retention. This is very rare.  If you are unable to urinate please call our clinic or go to urgent care or the emergency room.      You are advised to take it easy for the remainder of the day.  You may eat and drink as normal.  No restrictions to your normal daily routine.  As there is a risk of infection you will be given a course of antibiotics.  PLEASE COMPLETE THE FULL COURSE AS INSTRUCTED   Contact information:  Please call the clinic with any further questions [phone] *Do not leave an urgent message on this voicemail as we may not receive it until the following business day*

## 2023-12-08 NOTE — PROGRESS NOTES
Jose Alejandro HERMOSILLO Nyalison comes into clinic today at the request of Dr Galindo Ordering Provider for Med Injection only 1st Eligard .    Education of the medication was gone over, such as alleric reaction vs s/e of the medication and when to seek emergent care. S/e treatment for local site reaction was gone over as well. S/s of infection and when to call was discussed, pt verbalized understanding and had time to ask questions.    This service provided today was under the supervising provider of the day Dr Galindo, who was available by phone if needed.    JANINA Regalado RN 12/8/2023 3:51 PM

## 2023-12-26 ENCOUNTER — TELEPHONE (OUTPATIENT)
Dept: FAMILY MEDICINE | Facility: CLINIC | Age: 68
End: 2023-12-26
Payer: COMMERCIAL

## 2023-12-26 ENCOUNTER — TELEPHONE (OUTPATIENT)
Dept: UROLOGY | Facility: CLINIC | Age: 68
End: 2023-12-26
Payer: COMMERCIAL

## 2023-12-26 DIAGNOSIS — C61 PROSTATE CANCER (H): ICD-10-CM

## 2023-12-26 NOTE — TELEPHONE ENCOUNTER
[facility] Call Center    Phone Message    May a detailed message be left on voicemail: yes     Reason for Call: Other: Pt requesting to reschedule his 1/26 apt with Dr. Galindo. Pt still wants to be seen in January before his oncology appt on 2/2.Writer unable to accommodate due to Dr. Galindo availability. Please advise.       Action Taken: Message routed to:  Other: URO    Travel Screening: Not Applicable

## 2023-12-26 NOTE — TELEPHONE ENCOUNTER
Patient was forwarded to the wrong line. He was attempting to get a hold of Dr. Galindo's care team. RN assisted with transferring to their team at [phone] (taken off AVS from 7/11/23).    Yomaira Hernández RN on 12/26/2023 at 1:14 PM

## 2023-12-27 NOTE — TELEPHONE ENCOUNTER
[facility] Call Center    Phone Message    May a detailed message be left on voicemail: yes     Reason for Call: Pt stated he's returning a call from us. Writer doesn't see any note.   Please call back. Thank you    Action Taken: Message routed to:  Other: Uro    Travel Screening: Not Applicable

## 2023-12-27 NOTE — TELEPHONE ENCOUNTER
Returned call to pt, advised dr glover is out of clinic until mid/lat Tony there are no openings to accommodate this request before the current radiation date. Pt will c/b after speaking with rad group    JANINA Regalado RN 12/27/2023 12:42 PM

## 2024-01-08 RX ORDER — LEVOFLOXACIN 500 MG/1
500 TABLET, FILM COATED ORAL DAILY
Qty: 1 TABLET | Refills: 0 | Status: SHIPPED | OUTPATIENT
Start: 2024-01-08 | End: 2024-03-22

## 2024-01-08 RX ORDER — LEVOFLOXACIN 500 MG/1
500 TABLET, FILM COATED ORAL DAILY
Qty: 1 TABLET | Refills: 0 | Status: CANCELLED | OUTPATIENT
Start: 2024-01-08

## 2024-01-08 NOTE — TELEPHONE ENCOUNTER
Pt called back, but Yomaira TURNER never routed encounter to urology dept. Called pt to follow up on message from TE 12/27. One tablet to replace accident tablet taken when originally filled replaced for gold seed.    JANINA Regalado RN 1/8/2024 11:21 AM

## 2024-01-08 NOTE — PROGRESS NOTES
Called pt to follow up on message from TE 12/27. One tablet to replace accident tablet taken when originally filled replaced for gold seed.    JANINA Regalado RN 1/8/2024 11:21 AM

## 2024-01-26 ENCOUNTER — OFFICE VISIT (OUTPATIENT)
Dept: UROLOGY | Facility: CLINIC | Age: 69
End: 2024-01-26
Payer: COMMERCIAL

## 2024-01-26 ENCOUNTER — ANCILLARY PROCEDURE (OUTPATIENT)
Dept: ULTRASOUND IMAGING | Facility: CLINIC | Age: 69
End: 2024-01-26
Payer: COMMERCIAL

## 2024-01-26 VITALS — OXYGEN SATURATION: 99 % | SYSTOLIC BLOOD PRESSURE: 155 MMHG | HEART RATE: 59 BPM | DIASTOLIC BLOOD PRESSURE: 99 MMHG

## 2024-01-26 DIAGNOSIS — C61 PROSTATE CANCER (H): ICD-10-CM

## 2024-01-26 DIAGNOSIS — I10 ESSENTIAL HYPERTENSION WITH GOAL BLOOD PRESSURE LESS THAN 140/90: Primary | ICD-10-CM

## 2024-01-26 PROCEDURE — 55876 PLACE RT DEVICE/MARKER PROS: CPT | Performed by: UROLOGY

## 2024-01-26 PROCEDURE — 96372 THER/PROPH/DIAG INJ SC/IM: CPT | Mod: 59 | Performed by: UROLOGY

## 2024-01-26 PROCEDURE — 76942 ECHO GUIDE FOR BIOPSY: CPT | Performed by: UROLOGY

## 2024-01-26 RX ORDER — LEVOFLOXACIN 500 MG/1
500 TABLET, FILM COATED ORAL DAILY
Qty: 2 TABLET | Refills: 0 | Status: SHIPPED | OUTPATIENT
Start: 2024-01-26 | End: 2024-03-22

## 2024-01-26 RX ORDER — GENTAMICIN 40 MG/ML
80 INJECTION, SOLUTION INTRAMUSCULAR; INTRAVENOUS ONCE
Status: COMPLETED | OUTPATIENT
Start: 2024-01-26 | End: 2024-01-26

## 2024-01-26 RX ADMIN — GENTAMICIN 80 MG: 40 INJECTION, SOLUTION INTRAMUSCULAR; INTRAVENOUS at 09:28

## 2024-01-26 NOTE — PATIENT INSTRUCTIONS
Fiducial marker/ Gold Seed placement instructions  Dr. Galindo  [address] [facility]  [place] [address]      GOLD SEED IMPLANT    What is gold seed implant?:  Gold seeds show up on X-ray imaging, allowing the physician to better focus the radiation at the prostate tumor. Knowing the position of the seeds--which are placed inside the prostate gland--enables the doctor to direct external beams of radiation at the tumor site with more preciseness. And because narrower beams can be used as a result of the visual, nearby tissues are less likely to be damaged.(Or otherwise: Smaller margins of error to decrease the dose of radiation to normal tissue, which should translate to a better side effects profile).    Procedure: You will be positioned for the implant as preferred by the physician. An ultrasound probe is inserted into the rectum giving the doctor a visual of the prostate glad on a monitor. A needle is then passed through the wall of the rectum and an injection of local anesthetic is given. Following the injection of local anesthetic, multiple small non-radioactive gold seeds in the prostate using a needle.The procedure will take approximately ten minutes.     What are the risks?  Infection: As there is a chance of infection we give you antibiotics before the procedure to reduce the risk. However is you experience a fever, chills, nausea, or just not feeling well up to 72 hours after the biopsy you need to go directly to the emergency room.   Antibiotics: The physician will send a prescription to your preferred pharmacy. You will follow the directions on the bottle. Start the antibiotics THE MORNING OF THE implant.   Bleeding: Some men will see blood in the urine, semen and stool.  Bleeding can last for 6-8 weeks intermittently.  If you take any medication such as aspirin, warfarin, clopidogrel then the risk of bleeding will be higher.   Retention: Some men are unable to pass urine after the biopsy. This is called  urine retention. This is very rare.  If you are unable to urinate please call our clinic or go to urgent care or the emergency room.     What to expect after:  You are advised to take it easy for the remainder of the day.  You may eat and drink as normal.  No restrictions to your normal daily routine.  As there is a risk of infection you will be given a course of antibiotics.  PLEASE COMPLETE THE FULL COURSE AS INSTRUCTED     Contact information:  Please call the clinic with any further questions [phone]

## 2024-01-26 NOTE — PROGRESS NOTES
Clinic Administered Medication Documentation        Patient was given Gentamicin. Prior to medication administration, verified patient's identity using patient s name and date of birth. Please see MAR and medication order for additional information. Patient instructed to remain in clinic for 15 minutes and report any adverse reaction to staff immediately.    Vial/Syringe: Single dose vial. Was entire vial of medication used? Yes    Education documentation:    To improve patient experience and health outcomes, patient was educated on Post Gold seed.    Teaching method:  Patient Received written materials handout and general discussion/verbal explanation    Patient acknowledged understanding on the education.     Diana MORROW RN Urology 1/26/2024 9:54 AM

## 2024-01-26 NOTE — PROGRESS NOTES
Jose Alejandro Eason is a 68 year old male who is in for prostate gold fiducial marker implantation.      Transrectal ultrasound was inserted with use of lidocaine rectal gel.  A prostatic nerve block was performed on the left and the right with 1% plain lidocaine.  2 markers were placed in the right prostate (base and apex) and 1 in the left mid prostate.    No complications were noted. The patient was given post biopsy instructions to drink a big glass of fluids preferably water for the next 6-8hrs.  He will call or return if he has voiding difficulties or high fevers.         HTN: Jose Alejandro to follow up with Primary Care provider regarding elevated blood pressure.

## 2024-02-02 ENCOUNTER — OFFICE VISIT (OUTPATIENT)
Dept: RADIATION ONCOLOGY | Facility: CLINIC | Age: 69
End: 2024-02-02
Payer: COMMERCIAL

## 2024-02-02 DIAGNOSIS — C61 PROSTATE CANCER (H): Primary | ICD-10-CM

## 2024-02-02 PROCEDURE — 77334 RADIATION TREATMENT AID(S): CPT | Performed by: SURGERY

## 2024-02-02 PROCEDURE — 99207 PR NO CHARGE LOS: CPT | Performed by: SURGERY

## 2024-02-02 PROCEDURE — 77263 THER RADIOLOGY TX PLNG CPLX: CPT | Performed by: SURGERY

## 2024-02-02 NOTE — LETTER
2/2/2024         RE: Jose Alejandro Eason  [address] [place] [address]        Dear Colleague,    Thank you for referring your patient, Jose Alejandro Eason, to the [facility] RADIATION [facility]COL[facility] [place]. Please see a copy of my visit note below.    A CT simulation was performed today for radiation therapy planning. See Mosaic for additional details.     Yuniel Escobar M.D.  Department of Radiation Oncology  [facility]       Again, thank you for allowing me to participate in the care of your patient.        Sincerely,        Yuniel Escobar MD

## 2024-02-08 ENCOUNTER — APPOINTMENT (OUTPATIENT)
Dept: RADIATION ONCOLOGY | Facility: CLINIC | Age: 69
End: 2024-02-08
Payer: COMMERCIAL

## 2024-02-08 PROCEDURE — 77338 DESIGN MLC DEVICE FOR IMRT: CPT | Performed by: SURGERY

## 2024-02-08 PROCEDURE — 77301 RADIOTHERAPY DOSE PLAN IMRT: CPT | Performed by: SURGERY

## 2024-02-08 PROCEDURE — 77300 RADIATION THERAPY DOSE PLAN: CPT | Performed by: SURGERY

## 2024-02-14 ENCOUNTER — APPOINTMENT (OUTPATIENT)
Dept: RADIATION ONCOLOGY | Facility: CLINIC | Age: 69
End: 2024-02-14
Payer: COMMERCIAL

## 2024-02-14 PROCEDURE — 77014 PR CT GUIDE FOR PLACEMENT RADIATION THERAPY FIELDS: CPT | Performed by: SURGERY

## 2024-02-15 ENCOUNTER — APPOINTMENT (OUTPATIENT)
Dept: RADIATION ONCOLOGY | Facility: CLINIC | Age: 69
End: 2024-02-15
Payer: COMMERCIAL

## 2024-02-15 PROCEDURE — 77014 PR CT GUIDE FOR PLACEMENT RADIATION THERAPY FIELDS: CPT | Performed by: SURGERY

## 2024-02-15 PROCEDURE — 77385 PR IMRT TREATMENT DELIVERY, SIMPLE: CPT | Performed by: SURGERY

## 2024-02-16 ENCOUNTER — APPOINTMENT (OUTPATIENT)
Dept: RADIATION ONCOLOGY | Facility: CLINIC | Age: 69
End: 2024-02-16
Payer: COMMERCIAL

## 2024-02-16 PROCEDURE — 77385 PR IMRT TREATMENT DELIVERY, SIMPLE: CPT | Performed by: SURGERY

## 2024-02-16 PROCEDURE — 77014 PR CT GUIDE FOR PLACEMENT RADIATION THERAPY FIELDS: CPT | Performed by: SURGERY

## 2024-02-19 ENCOUNTER — APPOINTMENT (OUTPATIENT)
Dept: RADIATION ONCOLOGY | Facility: CLINIC | Age: 69
End: 2024-02-19
Payer: COMMERCIAL

## 2024-02-19 PROCEDURE — 77014 PR CT GUIDE FOR PLACEMENT RADIATION THERAPY FIELDS: CPT | Performed by: SURGERY

## 2024-02-19 PROCEDURE — 77385 PR IMRT TREATMENT DELIVERY, SIMPLE: CPT | Performed by: SURGERY

## 2024-02-20 ENCOUNTER — APPOINTMENT (OUTPATIENT)
Dept: RADIATION ONCOLOGY | Facility: CLINIC | Age: 69
End: 2024-02-20
Payer: COMMERCIAL

## 2024-02-20 PROCEDURE — 77385 PR IMRT TREATMENT DELIVERY, SIMPLE: CPT | Performed by: RADIOLOGY

## 2024-02-20 PROCEDURE — 77014 PR CT GUIDE FOR PLACEMENT RADIATION THERAPY FIELDS: CPT | Performed by: RADIOLOGY

## 2024-02-21 ENCOUNTER — APPOINTMENT (OUTPATIENT)
Dept: RADIATION ONCOLOGY | Facility: CLINIC | Age: 69
End: 2024-02-21
Payer: COMMERCIAL

## 2024-02-21 ENCOUNTER — OFFICE VISIT (OUTPATIENT)
Dept: RADIATION ONCOLOGY | Facility: CLINIC | Age: 69
End: 2024-02-21
Payer: COMMERCIAL

## 2024-02-21 VITALS
TEMPERATURE: 98 F | RESPIRATION RATE: 18 BRPM | DIASTOLIC BLOOD PRESSURE: 94 MMHG | SYSTOLIC BLOOD PRESSURE: 155 MMHG | BODY MASS INDEX: 28.11 KG/M2 | OXYGEN SATURATION: 97 % | WEIGHT: 187.6 LBS | HEART RATE: 64 BPM

## 2024-02-21 DIAGNOSIS — C61 PROSTATE CANCER (H): Primary | ICD-10-CM

## 2024-02-21 PROCEDURE — 99207 PR DROP WITH A PROCEDURE: CPT | Performed by: SURGERY

## 2024-02-21 PROCEDURE — 77385 PR IMRT TREATMENT DELIVERY, SIMPLE: CPT | Performed by: SURGERY

## 2024-02-21 PROCEDURE — 77014 PR CT GUIDE FOR PLACEMENT RADIATION THERAPY FIELDS: CPT | Performed by: SURGERY

## 2024-02-21 PROCEDURE — 77427 RADIATION TX MANAGEMENT X5: CPT | Performed by: SURGERY

## 2024-02-21 ASSESSMENT — PAIN SCALES - GENERAL: PAINLEVEL: NO PAIN (0)

## 2024-02-21 NOTE — PATIENT INSTRUCTIONS
Please contact [place] [facility] RN with questions or concerns following today's appointment: [phone].       Please feel free to leave a detailed message if your call is not answered.    If your call is not received before 3:00 PM, it may not be returned until the following business day.    If you are receiving radiation treatment and need assistance after 3:00 PM or on the weekends, please call [phone] and ask to speak to the radiation oncologist on-call.    Thank you!    Kassandra TURNER

## 2024-02-21 NOTE — LETTER
2024         RE: Jose Alejandro Eason  [address] [place] [address]        Dear Colleague,    Thank you for referring your patient, Jose Alejandro Eason, to the [facility] RADIATION ONCOLOGY [place]. Please see a copy of my visit note below.    [facility] PHYSICIANS  SPECIALIZING IN BREAKTHROUGHS  Radiation Oncology    On Treatment Visit Note      Jose Alejandro Eason      Date: 2024   MRN: [id number]   : 1955  Diagnosis: prostate cancer        ID: Mr. Eason is a 66 year old male who was is on active surveillance for low risk prostate cancer ( cT1c,PSA 6.99, GS 3+3=6, ) now presenting with progression to unfavorable, intermediate risk prostate cancer (cT1c, PSA 11.7, GS 3+4=7 (3 cores positive with 1% of kt 4). Metastatic work up is negative for LAD or distant disease.      Reason for Visit:  On Radiation Treatment Visit       Treatment Summary to Date  Treatment Site: prostate + SV Current Dose: 1250/7000 cGy Fractions:         Chemotherapy  Chemo concurrent with radx?: No    Subjective:   No complaints, tolerating RT well overall.    Nursing ROS:   Nutrition Alteration  Diet Type: Patient's Preference  Skin  Skin Reaction: 0 - No changes        Cardiovascular  Respiratory effort: 1 - Normal - without distress  Gastrointestinal  Nausea: 0 - None  Diarrhea: 0 - None  Genitourinary  Urinary Status: 0 - Normal  Psychosocial  Mood - Anxiety: 0 - Normal  Mood - Depression: 0 - Normal  Psychosocial Note: energy level at baseline  Pain Assessment  0-10 Pain Scale: 0      Objective:   BP (!) 155/94 (BP Location: Left arm, Patient Position: Chair, Cuff Size: Adult Regular)   Pulse 64   Temp 98  F (36.7  C) (Oral)   Resp 18   Wt 85.1 kg (187 lb 9.6 oz)   SpO2 97%   BMI 28.11 kg/m    Gen: Appears well, in no acute distress  Skin: No erythema  CV/Resp: rrr, breathing comfortably on room air  Neuro: CN 2-12 grossly intact, UE/LE full strength     Labs:  CBC  RESULTS:   Recent Labs   Lab Test 06/13/19  1708   WBC 6.8   RBC 4.04*   HGB 12.1*   HCT 36.8*   MCV 91   MCH 30.0   MCHC 32.9   RDW 13.9        ELECTROLYTES:  Recent Labs   Lab Test 12/01/17  0900   CR 0.95       Assessment:    Tolerating radiation therapy well.  All questions and concerns addressed.      Plan:   Continue current therapy.        Mosaiq chart and setup information reviewed  Ports checked and MVCT/IGRT images checked    Medication Review  Med list reviewed with patient?: Yes  Med list printed and given: Offered and declined  Med Note: Eligard 45 mg injection received on 12/8/2023 with Dr. Galindo    Educational Topic Discussed  Education Instructions: radiation therapy side effects: fatigue, skin changes and skin cares, nausea/vomiting, diarrhea, urinary and bladder changes      Yuniel Escobar MD  Radiation Oncology   [facility]: [phone]        Again, thank you for allowing me to participate in the care of your patient.        Sincerely,        Yuniel Escobar MD

## 2024-02-22 ENCOUNTER — APPOINTMENT (OUTPATIENT)
Dept: RADIATION ONCOLOGY | Facility: CLINIC | Age: 69
End: 2024-02-22
Payer: COMMERCIAL

## 2024-02-22 PROCEDURE — 77014 PR CT GUIDE FOR PLACEMENT RADIATION THERAPY FIELDS: CPT | Mod: TC | Performed by: SURGERY

## 2024-02-22 PROCEDURE — 77385 PR IMRT TREATMENT DELIVERY, SIMPLE: CPT | Performed by: SURGERY

## 2024-02-22 NOTE — PROGRESS NOTES
[facility] PHYSICIANS  SPECIALIZING IN BREAKTHROUGHS  Radiation Oncology    On Treatment Visit Note      Jose Alejandro Eason      Date: 2024   MRN: [id number]   : 1955  Diagnosis: prostate cancer        ID: Mr. Eason is a 66 year old male who was is on active surveillance for low risk prostate cancer ( cT1c,PSA 6.99, GS 3+3=6, 2018) now presenting with progression to unfavorable, intermediate risk prostate cancer (cT1c, PSA 11.7, GS 3+4=7 (3 cores positive with 1% of kt 4). Metastatic work up is negative for LAD or distant disease.      Reason for Visit:  On Radiation Treatment Visit       Treatment Summary to Date  Treatment Site: prostate + SV Current Dose: 1250/7000 cGy Fractions:         Chemotherapy  Chemo concurrent with radx?: No    Subjective:   No complaints, tolerating RT well overall.    Nursing ROS:   Nutrition Alteration  Diet Type: Patient's Preference  Skin  Skin Reaction: 0 - No changes        Cardiovascular  Respiratory effort: 1 - Normal - without distress  Gastrointestinal  Nausea: 0 - None  Diarrhea: 0 - None  Genitourinary  Urinary Status: 0 - Normal  Psychosocial  Mood - Anxiety: 0 - Normal  Mood - Depression: 0 - Normal  Psychosocial Note: energy level at baseline  Pain Assessment  0-10 Pain Scale: 0      Objective:   BP (!) 155/94 (BP Location: Left arm, Patient Position: Chair, Cuff Size: Adult Regular)   Pulse 64   Temp 98  F (36.7  C) (Oral)   Resp 18   Wt 85.1 kg (187 lb 9.6 oz)   SpO2 97%   BMI 28.11 kg/m    Gen: Appears well, in no acute distress  Skin: No erythema  CV/Resp: rrr, breathing comfortably on room air  Neuro: CN 2-12 grossly intact, UE/LE full strength     Labs:  CBC RESULTS:   Recent Labs   Lab Test 19  1708   WBC 6.8   RBC 4.04*   HGB 12.1*   HCT 36.8*   MCV 91   MCH 30.0   MCHC 32.9   RDW 13.9        ELECTROLYTES:  Recent Labs   Lab Test 17  0900   CR 0.95       Assessment:    Tolerating radiation therapy  well.  All questions and concerns addressed.      Plan:   Continue current therapy.        Mosaiq chart and setup information reviewed  Ports checked and MVCT/IGRT images checked    Medication Review  Med list reviewed with patient?: Yes  Med list printed and given: Offered and declined  Med Note: Eligard 45 mg injection received on 12/8/2023 with Dr. Galindo    Educational Topic Discussed  Education Instructions: radiation therapy side effects: fatigue, skin changes and skin cares, nausea/vomiting, diarrhea, urinary and bladder changes      Yuniel Escobar MD  Radiation Oncology   [facility]  Clinic: [phone]

## 2024-02-23 ENCOUNTER — APPOINTMENT (OUTPATIENT)
Dept: RADIATION ONCOLOGY | Facility: CLINIC | Age: 69
End: 2024-02-23
Payer: COMMERCIAL

## 2024-02-23 PROCEDURE — 77014 PR CT GUIDE FOR PLACEMENT RADIATION THERAPY FIELDS: CPT | Performed by: SURGERY

## 2024-02-23 PROCEDURE — 77385 PR IMRT TREATMENT DELIVERY, SIMPLE: CPT | Performed by: SURGERY

## 2024-02-26 ENCOUNTER — APPOINTMENT (OUTPATIENT)
Dept: RADIATION ONCOLOGY | Facility: CLINIC | Age: 69
End: 2024-02-26
Payer: COMMERCIAL

## 2024-02-26 PROCEDURE — 77014 PR CT GUIDE FOR PLACEMENT RADIATION THERAPY FIELDS: CPT | Performed by: RADIOLOGY

## 2024-02-26 PROCEDURE — 77385 PR IMRT TREATMENT DELIVERY, SIMPLE: CPT | Performed by: RADIOLOGY

## 2024-02-27 ENCOUNTER — APPOINTMENT (OUTPATIENT)
Dept: RADIATION ONCOLOGY | Facility: CLINIC | Age: 69
End: 2024-02-27
Payer: COMMERCIAL

## 2024-02-27 PROCEDURE — 77014 PR CT GUIDE FOR PLACEMENT RADIATION THERAPY FIELDS: CPT | Performed by: RADIOLOGY

## 2024-02-27 PROCEDURE — 77385 PR IMRT TREATMENT DELIVERY, SIMPLE: CPT | Performed by: RADIOLOGY

## 2024-02-28 ENCOUNTER — APPOINTMENT (OUTPATIENT)
Dept: RADIATION ONCOLOGY | Facility: CLINIC | Age: 69
End: 2024-02-28
Payer: COMMERCIAL

## 2024-02-28 ENCOUNTER — OFFICE VISIT (OUTPATIENT)
Dept: RADIATION ONCOLOGY | Facility: CLINIC | Age: 69
End: 2024-02-28
Payer: COMMERCIAL

## 2024-02-28 VITALS
RESPIRATION RATE: 18 BRPM | TEMPERATURE: 97.9 F | DIASTOLIC BLOOD PRESSURE: 104 MMHG | OXYGEN SATURATION: 99 % | WEIGHT: 185.3 LBS | BODY MASS INDEX: 27.76 KG/M2 | SYSTOLIC BLOOD PRESSURE: 147 MMHG | HEART RATE: 63 BPM

## 2024-02-28 DIAGNOSIS — C61 PROSTATE CANCER (H): Primary | ICD-10-CM

## 2024-02-28 PROCEDURE — 99207 PR DROP WITH A PROCEDURE: CPT | Performed by: SURGERY

## 2024-02-28 PROCEDURE — 77427 RADIATION TX MANAGEMENT X5: CPT | Performed by: SURGERY

## 2024-02-28 PROCEDURE — 77014 PR CT GUIDE FOR PLACEMENT RADIATION THERAPY FIELDS: CPT | Mod: 26 | Performed by: RADIOLOGY

## 2024-02-28 PROCEDURE — 77336 RADIATION PHYSICS CONSULT: CPT | Performed by: SURGERY

## 2024-02-28 PROCEDURE — 77385 PR IMRT TREATMENT DELIVERY, SIMPLE: CPT | Performed by: SURGERY

## 2024-02-28 PROCEDURE — 77014 PR CT GUIDE FOR PLACEMENT RADIATION THERAPY FIELDS: CPT | Mod: TC | Performed by: SURGERY

## 2024-02-28 ASSESSMENT — PAIN SCALES - GENERAL: PAINLEVEL: NO PAIN (0)

## 2024-02-28 NOTE — PATIENT INSTRUCTIONS
Please contact [facility] RN with questions or concerns following today's appointment: [phone].       Please feel free to leave a detailed message if your call is not answered.    If your call is not received before 3:00 PM, it may not be returned until the following business day.    If you are receiving radiation treatment and need assistance after 3:00 PM or on the weekends, please call [phone] and ask to speak to the radiation oncologist on-call.    Thank you!    Kassandra TURNER

## 2024-02-28 NOTE — LETTER
"    2024         RE: Jose Alejandro Eason  [address] [place] [address]        Dear Colleague,    Thank you for referring your patient, Jose Alejandro Eason, to the [facility] RADIATION ONCOLOGY [place]. Please see a copy of my visit note below.    [facility] PHYSICIANS  SPECIALIZING IN BREAKTHROUGHS  Radiation Oncology    On Treatment Visit Note      Jose Alejandro Eason      Date: 2024   MRN: [id number]   : 1955  Diagnosis: prostate cancer        ID: Mr. Eason is a 66 year old male who was is on active surveillance for low risk prostate cancer ( cT1c,PSA 6.99, GS 3+3=6, ) now presenting with progression to unfavorable, intermediate risk prostate cancer (cT1c, PSA 11.7, GS 3+4=7 (3 cores positive with 1% of kt 4). Metastatic work up is negative for LAD or distant disease.      Reason for Visit:  On Radiation Treatment Visit       Treatment Summary to Date  Treatment Site: prostate + SV Current Dose: 2500/7000 cGy Fractions: 10/28        Chemotherapy  Chemo concurrent with radx?: No    Subjective:   No complaints, tolerating RT well overall. Mild dysuria, self resolving with hydration.     Nursing ROS:   Nutrition Alteration  Diet Type: Patient's Preference  Skin  Skin Reaction: 0 - No changes        Cardiovascular  Respiratory effort: 1 - Normal - without distress  Gastrointestinal  Nausea: 0 - None  Diarrhea: 0 - None  Genitourinary  Urinary Status: 0 - Normal   Note: patient reports intermittent \"stinging\" with urination, resolved with hydration  Psychosocial  Mood - Anxiety: 0 - Normal  Mood - Depression: 0 - Normal  Psychosocial Note: energy level at baseline  Pain Assessment  0-10 Pain Scale: 0      Objective:   BP (!) 147/104 (BP Location: Left arm, Patient Position: Chair, Cuff Size: Adult Regular)   Pulse 63   Temp 97.9  F (36.6  C) (Oral)   Resp 18   Wt 84.1 kg (185 lb 4.8 oz)   SpO2 99%   BMI 27.76 kg/m    Gen: Appears well, in no acute " distress  Skin: No erythema  CV/Resp: rrr, breathing comfortably on room air  Neuro: CN 2-12 grossly intact, UE/LE full strength     Labs:  CBC RESULTS:   Recent Labs   Lab Test 06/13/19  1708   WBC 6.8   RBC 4.04*   HGB 12.1*   HCT 36.8*   MCV 91   MCH 30.0   MCHC 32.9   RDW 13.9        ELECTROLYTES:  Recent Labs   Lab Test 12/01/17  0900   CR 0.95       Assessment:    Tolerating radiation therapy well.  All questions and concerns addressed.      Plan:   Continue current therapy.        Mosaiq chart and setup information reviewed  Ports checked and MVCT/IGRT images checked    Medication Review  Med list reviewed with patient?: Yes  Med list printed and given: Offered and declined  Med Note: Eligard 45 mg injection received on 12/8/2023 with Dr. Galindo    Educational Topic Discussed  Education Instructions: radiation therapy side effects: fatigue, skin changes and skin cares, nausea/vomiting, diarrhea, urinary and bladder changes      Yuniel Escobar MD  Radiation Oncology   [facility]  Clinic: [phone]        Again, thank you for allowing me to participate in the care of your patient.        Sincerely,        Yuniel Escobar MD

## 2024-02-28 NOTE — PROGRESS NOTES
"[facility] PHYSICIANS  SPECIALIZING IN BREAKTHROUGHS  Radiation Oncology    On Treatment Visit Note      Jose Alejandro Eason      Date: 2024   MRN: [id number]   : 1955  Diagnosis: prostate cancer        ID: Mr. Eason is a 66 year old male who was is on active surveillance for low risk prostate cancer ( cT1c,PSA 6.99, GS 3+3=6, 2018) now presenting with progression to unfavorable, intermediate risk prostate cancer (cT1c, PSA 11.7, GS 3+4=7 (3 cores positive with 1% of kt 4). Metastatic work up is negative for LAD or distant disease.      Reason for Visit:  On Radiation Treatment Visit       Treatment Summary to Date  Treatment Site: prostate + SV Current Dose: 2500/7000 cGy Fractions: 10/28        Chemotherapy  Chemo concurrent with radx?: No    Subjective:   No complaints, tolerating RT well overall. Mild dysuria, self resolving with hydration.     Nursing ROS:   Nutrition Alteration  Diet Type: Patient's Preference  Skin  Skin Reaction: 0 - No changes        Cardiovascular  Respiratory effort: 1 - Normal - without distress  Gastrointestinal  Nausea: 0 - None  Diarrhea: 0 - None  Genitourinary  Urinary Status: 0 - Normal   Note: patient reports intermittent \"stinging\" with urination, resolved with hydration  Psychosocial  Mood - Anxiety: 0 - Normal  Mood - Depression: 0 - Normal  Psychosocial Note: energy level at baseline  Pain Assessment  0-10 Pain Scale: 0      Objective:   BP (!) 147/104 (BP Location: Left arm, Patient Position: Chair, Cuff Size: Adult Regular)   Pulse 63   Temp 97.9  F (36.6  C) (Oral)   Resp 18   Wt 84.1 kg (185 lb 4.8 oz)   SpO2 99%   BMI 27.76 kg/m    Gen: Appears well, in no acute distress  Skin: No erythema  CV/Resp: rrr, breathing comfortably on room air  Neuro: CN 2-12 grossly intact, UE/LE full strength     Labs:  CBC RESULTS:   Recent Labs   Lab Test 19  1708   WBC 6.8   RBC 4.04*   HGB 12.1*   HCT 36.8*   MCV 91   MCH 30.0   MCHC 32.9 "   RDW 13.9        ELECTROLYTES:  Recent Labs   Lab Test 12/01/17  0900   CR 0.95       Assessment:    Tolerating radiation therapy well.  All questions and concerns addressed.      Plan:   Continue current therapy.        Mosaiq chart and setup information reviewed  Ports checked and MVCT/IGRT images checked    Medication Review  Med list reviewed with patient?: Yes  Med list printed and given: Offered and declined  Med Note: Eligard 45 mg injection received on 12/8/2023 with Dr. Galindo    Educational Topic Discussed  Education Instructions: radiation therapy side effects: fatigue, skin changes and skin cares, nausea/vomiting, diarrhea, urinary and bladder changes      Yuniel Escobar MD  Radiation Oncology   [facility]  Clinic: [phone]

## 2024-02-29 ENCOUNTER — APPOINTMENT (OUTPATIENT)
Dept: RADIATION ONCOLOGY | Facility: CLINIC | Age: 69
End: 2024-02-29
Payer: COMMERCIAL

## 2024-02-29 PROCEDURE — 77014 PR CT GUIDE FOR PLACEMENT RADIATION THERAPY FIELDS: CPT | Performed by: SURGERY

## 2024-02-29 PROCEDURE — 77385 PR IMRT TREATMENT DELIVERY, SIMPLE: CPT | Performed by: SURGERY

## 2024-03-01 ENCOUNTER — APPOINTMENT (OUTPATIENT)
Dept: RADIATION ONCOLOGY | Facility: CLINIC | Age: 69
End: 2024-03-01
Payer: COMMERCIAL

## 2024-03-01 PROCEDURE — 77385 PR IMRT TREATMENT DELIVERY, SIMPLE: CPT | Performed by: SURGERY

## 2024-03-01 PROCEDURE — 77014 PR CT GUIDE FOR PLACEMENT RADIATION THERAPY FIELDS: CPT | Performed by: SURGERY

## 2024-03-04 ENCOUNTER — APPOINTMENT (OUTPATIENT)
Dept: RADIATION ONCOLOGY | Facility: CLINIC | Age: 69
End: 2024-03-04
Payer: COMMERCIAL

## 2024-03-04 PROCEDURE — 77014 PR CT GUIDE FOR PLACEMENT RADIATION THERAPY FIELDS: CPT | Performed by: SURGERY

## 2024-03-04 PROCEDURE — 77385 PR IMRT TREATMENT DELIVERY, SIMPLE: CPT | Performed by: SURGERY

## 2024-03-05 ENCOUNTER — APPOINTMENT (OUTPATIENT)
Dept: RADIATION ONCOLOGY | Facility: CLINIC | Age: 69
End: 2024-03-05
Payer: COMMERCIAL

## 2024-03-05 PROCEDURE — 77385 PR IMRT TREATMENT DELIVERY, SIMPLE: CPT | Performed by: RADIOLOGY

## 2024-03-05 PROCEDURE — 77014 PR CT GUIDE FOR PLACEMENT RADIATION THERAPY FIELDS: CPT | Performed by: RADIOLOGY

## 2024-03-06 ENCOUNTER — OFFICE VISIT (OUTPATIENT)
Dept: RADIATION ONCOLOGY | Facility: CLINIC | Age: 69
End: 2024-03-06
Payer: COMMERCIAL

## 2024-03-06 ENCOUNTER — APPOINTMENT (OUTPATIENT)
Dept: RADIATION ONCOLOGY | Facility: CLINIC | Age: 69
End: 2024-03-06
Payer: COMMERCIAL

## 2024-03-06 VITALS
HEART RATE: 70 BPM | DIASTOLIC BLOOD PRESSURE: 94 MMHG | BODY MASS INDEX: 28.06 KG/M2 | OXYGEN SATURATION: 100 % | SYSTOLIC BLOOD PRESSURE: 153 MMHG | TEMPERATURE: 97.5 F | RESPIRATION RATE: 18 BRPM | WEIGHT: 187.3 LBS

## 2024-03-06 DIAGNOSIS — C61 PROSTATE CANCER (H): Primary | ICD-10-CM

## 2024-03-06 PROCEDURE — 99207 PR DROP WITH A PROCEDURE: CPT | Performed by: SURGERY

## 2024-03-06 PROCEDURE — 77385 PR IMRT TREATMENT DELIVERY, SIMPLE: CPT | Performed by: SURGERY

## 2024-03-06 PROCEDURE — 77336 RADIATION PHYSICS CONSULT: CPT | Performed by: SURGERY

## 2024-03-06 PROCEDURE — 77427 RADIATION TX MANAGEMENT X5: CPT | Performed by: SURGERY

## 2024-03-06 PROCEDURE — 77014 PR CT GUIDE FOR PLACEMENT RADIATION THERAPY FIELDS: CPT | Performed by: SURGERY

## 2024-03-06 ASSESSMENT — PAIN SCALES - GENERAL: PAINLEVEL: NO PAIN (0)

## 2024-03-06 NOTE — LETTER
"    3/6/2024         RE: Jose Alejandro Eason  [address] [place] [address]        Dear Colleague,    Thank you for referring your patient, Jose Alejandro Eason, to the [facility] RADIATION ONCOLOGY [place]. Please see a copy of my visit note below.    [facility] PHYSICIANS  SPECIALIZING IN BREAKTHROUGHS  Radiation Oncology    On Treatment Visit Note      Jose Alejandro Eason      Date: Mar 6, 2024   MRN: [id number]   : 1955  Diagnosis: prostate cancer      ID: Mr. Eason is a 66 year old male who was is on active surveillance for low risk prostate cancer ( cT1c,PSA 6.99, GS 3+3=6, ) now presenting with progression to unfavorable, intermediate risk prostate cancer (cT1c, PSA 11.7, GS 3+4=7 (3 cores positive with 1% of kt 4). Metastatic work up is negative for LAD or distant disease.      Reason for Visit:  On Radiation Treatment Visit       Treatment Summary to Date  Treatment Site: prostate + SV Current Dose: 3750/7000 cGy Fractions: 15/28        Chemotherapy  Chemo concurrent with radx?: No    Subjective:   No complaints, tolerating RT well overall. Mild dysuria, self resolving with hydration.     Nursing ROS:   Nutrition Alteration  Diet Type: Patient's Preference  Skin  Skin Reaction: 0 - No changes        Cardiovascular  Respiratory effort: 1 - Normal - without distress  Gastrointestinal  Nausea: 0 - None  Diarrhea: 0 - None  Genitourinary  Urinary Status: 0 - Normal   Note: patient reports intermittent \"stinging\" with urination, resolved with hydration  Psychosocial  Mood - Anxiety: 0 - Normal  Mood - Depression: 0 - Normal  Psychosocial Note: energy level at baseline  Pain Assessment  0-10 Pain Scale: 0      Objective:   BP (!) 153/94 (BP Location: Left arm, Patient Position: Chair, Cuff Size: Adult Regular)   Pulse 70   Temp 97.5  F (36.4  C) (Oral)   Resp 18   Wt 85 kg (187 lb 4.8 oz)   SpO2 100%   BMI 28.06 kg/m    Gen: Appears well, in no acute distress  Skin: " No erythema  CV/Resp: rrr, breathing comfortably on room air  Neuro: CN 2-12 grossly intact, UE/LE full strength     Labs:  CBC RESULTS:   Recent Labs   Lab Test 06/13/19  1708   WBC 6.8   RBC 4.04*   HGB 12.1*   HCT 36.8*   MCV 91   MCH 30.0   MCHC 32.9   RDW 13.9        ELECTROLYTES:  Recent Labs   Lab Test 12/01/17  0900   CR 0.95       Assessment:    Tolerating radiation therapy well.  All questions and concerns addressed.      Plan:   Continue current therapy.        Mosaiq chart and setup information reviewed  Ports checked and MVCT/IGRT images checked    Medication Review  Med list reviewed with patient?: Yes  Med list printed and given: Offered and declined  Med Note: Eligard 45 mg injection received on 12/8/2023 with Dr. Galindo    Educational Topic Discussed  Education Instructions: radiation therapy side effects: fatigue, skin changes and skin cares, nausea/vomiting, diarrhea, urinary and bladder changes      Yuniel Escobar MD  Radiation Oncology   [facility]  Clinic: [phone]        Again, thank you for allowing me to participate in the care of your patient.        Sincerely,        Yuniel Escobar MD

## 2024-03-06 NOTE — PATIENT INSTRUCTIONS
Please contact [facility] RN with questions or concerns following today's appointment: [phone].       Please feel free to leave a detailed message if your call is not answered.    If your call is not received before 3:00 PM, it may not be returned until the following business day.    If you are receiving radiation treatment and need assistance after 3:00 PM or on the weekends, please call [phone] and ask to speak to the radiation oncologist on-call.    Thank you!    Kassandra TURNER

## 2024-03-06 NOTE — PROGRESS NOTES
"[facility] PHYSICIANS  SPECIALIZING IN BREAKTHROUGHS  Radiation Oncology    On Treatment Visit Note      Jose Alejandro Eason      Date: Mar 6, 2024   MRN: [id number]   : 1955  Diagnosis: prostate cancer      ID: Mr. Eason is a 66 year old male who was is on active surveillance for low risk prostate cancer ( cT1c,PSA 6.99, GS 3+3=6, ) now presenting with progression to unfavorable, intermediate risk prostate cancer (cT1c, PSA 11.7, GS 3+4=7 (3 cores positive with 1% of kt 4). Metastatic work up is negative for LAD or distant disease.      Reason for Visit:  On Radiation Treatment Visit       Treatment Summary to Date  Treatment Site: prostate + SV Current Dose: 3750/7000 cGy Fractions: 15/28        Chemotherapy  Chemo concurrent with radx?: No    Subjective:   No complaints, tolerating RT well overall. Mild dysuria, self resolving with hydration.     Nursing ROS:   Nutrition Alteration  Diet Type: Patient's Preference  Skin  Skin Reaction: 0 - No changes        Cardiovascular  Respiratory effort: 1 - Normal - without distress  Gastrointestinal  Nausea: 0 - None  Diarrhea: 0 - None  Genitourinary  Urinary Status: 0 - Normal   Note: patient reports intermittent \"stinging\" with urination, resolved with hydration  Psychosocial  Mood - Anxiety: 0 - Normal  Mood - Depression: 0 - Normal  Psychosocial Note: energy level at baseline  Pain Assessment  0-10 Pain Scale: 0      Objective:   BP (!) 153/94 (BP Location: Left arm, Patient Position: Chair, Cuff Size: Adult Regular)   Pulse 70   Temp 97.5  F (36.4  C) (Oral)   Resp 18   Wt 85 kg (187 lb 4.8 oz)   SpO2 100%   BMI 28.06 kg/m    Gen: Appears well, in no acute distress  Skin: No erythema  CV/Resp: rrr, breathing comfortably on room air  Neuro: CN 2-12 grossly intact, UE/LE full strength     Labs:  CBC RESULTS:   Recent Labs   Lab Test 19  1708   WBC 6.8   RBC 4.04*   HGB 12.1*   HCT 36.8*   MCV 91   MCH 30.0   MCHC 32.9   RDW " 13.9        ELECTROLYTES:  Recent Labs   Lab Test 12/01/17  0900   CR 0.95       Assessment:    Tolerating radiation therapy well.  All questions and concerns addressed.      Plan:   Continue current therapy.        Mosaiq chart and setup information reviewed  Ports checked and MVCT/IGRT images checked    Medication Review  Med list reviewed with patient?: Yes  Med list printed and given: Offered and declined  Med Note: Eligard 45 mg injection received on 12/8/2023 with Dr. Galindo    Educational Topic Discussed  Education Instructions: radiation therapy side effects: fatigue, skin changes and skin cares, nausea/vomiting, diarrhea, urinary and bladder changes      Yuniel Escobar MD  Radiation Oncology   [facility]  Clinic: [phone]

## 2024-03-07 ENCOUNTER — APPOINTMENT (OUTPATIENT)
Dept: RADIATION ONCOLOGY | Facility: CLINIC | Age: 69
End: 2024-03-07
Payer: COMMERCIAL

## 2024-03-07 PROCEDURE — 77385 PR IMRT TREATMENT DELIVERY, SIMPLE: CPT | Performed by: SURGERY

## 2024-03-07 PROCEDURE — 77014 PR CT GUIDE FOR PLACEMENT RADIATION THERAPY FIELDS: CPT | Performed by: SURGERY

## 2024-03-08 ENCOUNTER — APPOINTMENT (OUTPATIENT)
Dept: RADIATION ONCOLOGY | Facility: CLINIC | Age: 69
End: 2024-03-08
Payer: COMMERCIAL

## 2024-03-08 PROCEDURE — 77014 PR CT GUIDE FOR PLACEMENT RADIATION THERAPY FIELDS: CPT | Performed by: SURGERY

## 2024-03-08 PROCEDURE — 77385 PR IMRT TREATMENT DELIVERY, SIMPLE: CPT | Performed by: SURGERY

## 2024-03-11 ENCOUNTER — APPOINTMENT (OUTPATIENT)
Dept: RADIATION ONCOLOGY | Facility: CLINIC | Age: 69
End: 2024-03-11
Payer: COMMERCIAL

## 2024-03-11 PROCEDURE — 77385 PR IMRT TREATMENT DELIVERY, SIMPLE: CPT | Performed by: RADIOLOGY

## 2024-03-11 PROCEDURE — 77014 PR CT GUIDE FOR PLACEMENT RADIATION THERAPY FIELDS: CPT | Performed by: RADIOLOGY

## 2024-03-12 ENCOUNTER — APPOINTMENT (OUTPATIENT)
Dept: RADIATION ONCOLOGY | Facility: CLINIC | Age: 69
End: 2024-03-12
Payer: COMMERCIAL

## 2024-03-12 PROCEDURE — 77014 PR CT GUIDE FOR PLACEMENT RADIATION THERAPY FIELDS: CPT | Mod: TC | Performed by: RADIOLOGY

## 2024-03-12 PROCEDURE — 77385 PR IMRT TREATMENT DELIVERY, SIMPLE: CPT | Performed by: RADIOLOGY

## 2024-03-13 ENCOUNTER — OFFICE VISIT (OUTPATIENT)
Dept: RADIATION ONCOLOGY | Facility: CLINIC | Age: 69
End: 2024-03-13
Payer: COMMERCIAL

## 2024-03-13 ENCOUNTER — APPOINTMENT (OUTPATIENT)
Dept: RADIATION ONCOLOGY | Facility: CLINIC | Age: 69
End: 2024-03-13
Payer: COMMERCIAL

## 2024-03-13 VITALS
DIASTOLIC BLOOD PRESSURE: 74 MMHG | BODY MASS INDEX: 28.02 KG/M2 | TEMPERATURE: 98.5 F | HEART RATE: 74 BPM | RESPIRATION RATE: 18 BRPM | WEIGHT: 187 LBS | SYSTOLIC BLOOD PRESSURE: 126 MMHG | OXYGEN SATURATION: 97 %

## 2024-03-13 DIAGNOSIS — C61 PROSTATE CANCER (H): Primary | ICD-10-CM

## 2024-03-13 PROCEDURE — 99207 PR DROP WITH A PROCEDURE: CPT | Performed by: SURGERY

## 2024-03-13 PROCEDURE — 77427 RADIATION TX MANAGEMENT X5: CPT | Performed by: SURGERY

## 2024-03-13 PROCEDURE — 77385 PR IMRT TREATMENT DELIVERY, SIMPLE: CPT | Performed by: SURGERY

## 2024-03-13 PROCEDURE — 77014 PR CT GUIDE FOR PLACEMENT RADIATION THERAPY FIELDS: CPT | Performed by: SURGERY

## 2024-03-13 PROCEDURE — 77336 RADIATION PHYSICS CONSULT: CPT | Performed by: SURGERY

## 2024-03-13 ASSESSMENT — PAIN SCALES - GENERAL: PAINLEVEL: NO PAIN (0)

## 2024-03-13 NOTE — PATIENT INSTRUCTIONS
Please contact [facility] RN with questions or concerns following today's appointment: [phone].       Please feel free to leave a detailed message if your call is not answered.    If your call is not received before 3:00 PM, it may not be returned until the following business day.    If you are receiving radiation treatment and need assistance after 3:00 PM or on the weekends, please call [phone] and ask to speak to the radiation oncologist on-call.    Thank you!    Kassandra TURNER

## 2024-03-13 NOTE — PROGRESS NOTES
"[facility] PHYSICIANS  SPECIALIZING IN BREAKTHROUGHS  Radiation Oncology    On Treatment Visit Note      Jose Alejandro Eason      Date: Mar 13, 2024   MRN: [id number]   : 1955  Diagnosis: prostate cancer      ID: Mr. Eason is a 68 year old male who was is on active surveillance for low risk prostate cancer ( cT1c,PSA 6.99, GS 3+3=6, ) now presenting with progression to unfavorable, intermediate risk prostate cancer (cT1c, PSA 11.7, GS 3+4=7 (3 cores positive with 1% of kt 4). Metastatic work up is negative for LAD or distant disease.      Reason for Visit:  On Radiation Treatment Visit       Treatment Summary to Date  Treatment Site: prostate + SV Current Dose: 5000/7000 cGy Fractions:         Chemotherapy  Chemo concurrent with radx?: No    Subjective:   No complaints, tolerating RT well overall. Mild dysuria, self resolving with hydration.     Nursing ROS:   Nutrition Alteration  Diet Type: Patient's Preference  Skin  Skin Reaction: 0 - No changes        Cardiovascular  Respiratory effort: 1 - Normal - without distress  Gastrointestinal  Nausea: 0 - None  Diarrhea: 0 - None  Genitourinary  Urinary Status: 0 - Normal   Note: patient reports intermittent \"stinging\" with urination, resolved with hydration  Psychosocial  Mood - Anxiety: 0 - Normal  Mood - Depression: 0 - Normal  Psychosocial Note: energy level at baseline  Pain Assessment  0-10 Pain Scale: 0      Objective:   /74 (BP Location: Left arm, Patient Position: Chair, Cuff Size: Adult Large)   Pulse 74   Temp 98.5  F (36.9  C) (Oral)   Resp 18   Wt 84.8 kg (187 lb)   SpO2 97%   BMI 28.02 kg/m    Gen: Appears well, in no acute distress  Skin: No erythema  CV/Resp: rrr, breathing comfortably on room air  Neuro: CN 2-12 grossly intact, UE/LE full strength     Labs:  CBC RESULTS:   Recent Labs   Lab Test 19  1708   WBC 6.8   RBC 4.04*   HGB 12.1*   HCT 36.8*   MCV 91   MCH 30.0   MCHC 32.9   RDW 13.9   PLT " 200     ELECTROLYTES:  Recent Labs   Lab Test 12/01/17  0900   CR 0.95       Assessment:    Tolerating radiation therapy well.  All questions and concerns addressed.      Plan:   Continue current therapy.        Mosaiq chart and setup information reviewed  Ports checked and MVCT/IGRT images checked    Medication Review  Med list reviewed with patient?: Yes  Med list printed and given: Offered and declined  Med Note: Eligard 45 mg injection received on 12/8/2023 with Dr. Galindo    Educational Topic Discussed  Education Instructions: radiation therapy side effects: fatigue, skin changes and skin cares, nausea/vomiting, diarrhea, urinary and bladder changes      Yuniel Escobar MD  Radiation Oncology   [facility]  Clinic: [phone]

## 2024-03-13 NOTE — LETTER
"    3/13/2024         RE: Jose Alejandro Eason  [address] [place] [address]        Dear Colleague,    Thank you for referring your patient, Jose Alejandro Eason, to the [facility] RADIATION ONCOLOGY [place]. Please see a copy of my visit note below.    [facility] PHYSICIANS  SPECIALIZING IN BREAKTHROUGHS  Radiation Oncology    On Treatment Visit Note      Jose Alejandro Eason      Date: Mar 13, 2024   MRN: [id number]   : 1955  Diagnosis: prostate cancer      ID: Mr. Eason is a 68 year old male who was is on active surveillance for low risk prostate cancer ( cT1c,PSA 6.99, GS 3+3=6, ) now presenting with progression to unfavorable, intermediate risk prostate cancer (cT1c, PSA 11.7, GS 3+4=7 (3 cores positive with 1% of kt 4). Metastatic work up is negative for LAD or distant disease.      Reason for Visit:  On Radiation Treatment Visit       Treatment Summary to Date  Treatment Site: prostate + SV Current Dose: 5000/7000 cGy Fractions:         Chemotherapy  Chemo concurrent with radx?: No    Subjective:   No complaints, tolerating RT well overall. Mild dysuria, self resolving with hydration.     Nursing ROS:   Nutrition Alteration  Diet Type: Patient's Preference  Skin  Skin Reaction: 0 - No changes        Cardiovascular  Respiratory effort: 1 - Normal - without distress  Gastrointestinal  Nausea: 0 - None  Diarrhea: 0 - None  Genitourinary  Urinary Status: 0 - Normal   Note: patient reports intermittent \"stinging\" with urination, resolved with hydration  Psychosocial  Mood - Anxiety: 0 - Normal  Mood - Depression: 0 - Normal  Psychosocial Note: energy level at baseline  Pain Assessment  0-10 Pain Scale: 0      Objective:   /74 (BP Location: Left arm, Patient Position: Chair, Cuff Size: Adult Large)   Pulse 74   Temp 98.5  F (36.9  C) (Oral)   Resp 18   Wt 84.8 kg (187 lb)   SpO2 97%   BMI 28.02 kg/m    Gen: Appears well, in no acute distress  Skin: No " erythema  CV/Resp: rrr, breathing comfortably on room air  Neuro: CN 2-12 grossly intact, UE/LE full strength     Labs:  CBC RESULTS:   Recent Labs   Lab Test 06/13/19  1708   WBC 6.8   RBC 4.04*   HGB 12.1*   HCT 36.8*   MCV 91   MCH 30.0   MCHC 32.9   RDW 13.9        ELECTROLYTES:  Recent Labs   Lab Test 12/01/17  0900   CR 0.95       Assessment:    Tolerating radiation therapy well.  All questions and concerns addressed.      Plan:   Continue current therapy.        Mosaiq chart and setup information reviewed  Ports checked and MVCT/IGRT images checked    Medication Review  Med list reviewed with patient?: Yes  Med list printed and given: Offered and declined  Med Note: Eligard 45 mg injection received on 12/8/2023 with Dr. Galindo    Educational Topic Discussed  Education Instructions: radiation therapy side effects: fatigue, skin changes and skin cares, nausea/vomiting, diarrhea, urinary and bladder changes      Yuniel Escobar MD  Radiation Oncology   [facility]  Clinic: [phone]        Again, thank you for allowing me to participate in the care of your patient.        Sincerely,        Yuniel Escobar MD

## 2024-03-14 ENCOUNTER — APPOINTMENT (OUTPATIENT)
Dept: RADIATION ONCOLOGY | Facility: CLINIC | Age: 69
End: 2024-03-14
Payer: COMMERCIAL

## 2024-03-14 PROCEDURE — 77385 PR IMRT TREATMENT DELIVERY, SIMPLE: CPT | Performed by: SURGERY

## 2024-03-14 PROCEDURE — 77014 PR CT GUIDE FOR PLACEMENT RADIATION THERAPY FIELDS: CPT | Performed by: SURGERY

## 2024-03-15 ENCOUNTER — APPOINTMENT (OUTPATIENT)
Dept: RADIATION ONCOLOGY | Facility: CLINIC | Age: 69
End: 2024-03-15
Payer: COMMERCIAL

## 2024-03-15 PROCEDURE — 77014 PR CT GUIDE FOR PLACEMENT RADIATION THERAPY FIELDS: CPT | Performed by: RADIOLOGY

## 2024-03-15 PROCEDURE — 77385 PR IMRT TREATMENT DELIVERY, SIMPLE: CPT | Performed by: RADIOLOGY

## 2024-03-18 ENCOUNTER — APPOINTMENT (OUTPATIENT)
Dept: RADIATION ONCOLOGY | Facility: CLINIC | Age: 69
End: 2024-03-18
Payer: COMMERCIAL

## 2024-03-18 PROCEDURE — 77014 PR CT GUIDE FOR PLACEMENT RADIATION THERAPY FIELDS: CPT | Performed by: RADIOLOGY

## 2024-03-18 PROCEDURE — 77385 PR IMRT TREATMENT DELIVERY, SIMPLE: CPT | Performed by: RADIOLOGY

## 2024-03-19 ENCOUNTER — APPOINTMENT (OUTPATIENT)
Dept: RADIATION ONCOLOGY | Facility: CLINIC | Age: 69
End: 2024-03-19
Payer: COMMERCIAL

## 2024-03-19 PROCEDURE — 77385 PR IMRT TREATMENT DELIVERY, SIMPLE: CPT | Performed by: RADIOLOGY

## 2024-03-19 PROCEDURE — 77014 PR CT GUIDE FOR PLACEMENT RADIATION THERAPY FIELDS: CPT | Performed by: RADIOLOGY

## 2024-03-20 ENCOUNTER — APPOINTMENT (OUTPATIENT)
Dept: RADIATION ONCOLOGY | Facility: CLINIC | Age: 69
End: 2024-03-20
Payer: COMMERCIAL

## 2024-03-20 ENCOUNTER — OFFICE VISIT (OUTPATIENT)
Dept: RADIATION ONCOLOGY | Facility: CLINIC | Age: 69
End: 2024-03-20
Payer: COMMERCIAL

## 2024-03-20 VITALS
WEIGHT: 189 LBS | OXYGEN SATURATION: 97 % | BODY MASS INDEX: 28.32 KG/M2 | DIASTOLIC BLOOD PRESSURE: 93 MMHG | HEART RATE: 69 BPM | TEMPERATURE: 97.4 F | SYSTOLIC BLOOD PRESSURE: 157 MMHG | RESPIRATION RATE: 18 BRPM

## 2024-03-20 DIAGNOSIS — C61 PROSTATE CANCER (H): Primary | ICD-10-CM

## 2024-03-20 PROCEDURE — 77014 PR CT GUIDE FOR PLACEMENT RADIATION THERAPY FIELDS: CPT | Performed by: SURGERY

## 2024-03-20 PROCEDURE — 77385 PR IMRT TREATMENT DELIVERY, SIMPLE: CPT | Performed by: SURGERY

## 2024-03-20 PROCEDURE — 77336 RADIATION PHYSICS CONSULT: CPT | Performed by: SURGERY

## 2024-03-20 PROCEDURE — 99207 PR DROP WITH A PROCEDURE: CPT | Performed by: SURGERY

## 2024-03-20 PROCEDURE — 77427 RADIATION TX MANAGEMENT X5: CPT | Performed by: SURGERY

## 2024-03-20 PROCEDURE — 77014 PR CT GUIDE FOR PLACEMENT RADIATION THERAPY FIELDS: CPT | Mod: 26 | Performed by: RADIOLOGY

## 2024-03-20 ASSESSMENT — PAIN SCALES - GENERAL: PAINLEVEL: NO PAIN (0)

## 2024-03-20 NOTE — PROGRESS NOTES
"[facility] PHYSICIANS  SPECIALIZING IN BREAKTHROUGHS  Radiation Oncology    On Treatment Visit Note      Jose Alejandro Eason      Date: Mar 20, 2024   MRN: [id number]   : 1955  Diagnosis: prostate cancer      ID: Mr. Eason is a 68 year old male who was is on active surveillance for low risk prostate cancer ( cT1c,PSA 6.99, GS 3+3=6, ) now presenting with progression to unfavorable, intermediate risk prostate cancer (cT1c, PSA 11.7, GS 3+4=7 (3 cores positive with 1% of kt 4). Metastatic work up is negative for LAD or distant disease.      Reason for Visit:  On Radiation Treatment Visit       Treatment Summary to Date  Treatment Site: prostate + SV Current Dose: 6250/7000 cGy Fractions:         Chemotherapy  Chemo concurrent with radx?: No    Subjective:   No complaints, tolerating RT well overall. Mild dysuria, self resolving with hydration.     Nursing ROS:   Nutrition Alteration  Diet Type: Patient's Preference  Skin  Skin Reaction: 0 - No changes        Cardiovascular  Respiratory effort: 1 - Normal - without distress  Gastrointestinal  Nausea: 0 - None  Diarrhea: 0 - None  Genitourinary  Urinary Status: 0 - Normal   Note: patient reports intermittent \"stinging\" with urination, resolved with hydration  Psychosocial  Mood - Anxiety: 0 - Normal  Mood - Depression: 0 - Normal  Psychosocial Note: energy level at baseline  Pain Assessment  0-10 Pain Scale: 0      Objective:   BP (!) 157/93 (BP Location: Left arm, Patient Position: Chair, Cuff Size: Adult Regular)   Pulse 69   Temp 97.4  F (36.3  C) (Oral)   Resp 18   Wt 85.7 kg (189 lb)   SpO2 97%   BMI 28.32 kg/m    Gen: Appears well, in no acute distress  Skin: No erythema  CV/Resp: rrr, breathing comfortably on room air  Neuro: CN 2-12 grossly intact, UE/LE full strength     Labs:  CBC RESULTS:   Recent Labs   Lab Test 19  1708   WBC 6.8   RBC 4.04*   HGB 12.1*   HCT 36.8*   MCV 91   MCH 30.0   MCHC 32.9   RDW 13.9 "        ELECTROLYTES:  Recent Labs   Lab Test 12/01/17  0900   CR 0.95       Assessment:    Tolerating radiation therapy well.  All questions and concerns addressed.      Plan:   Continue current therapy.        Mosaiq chart and setup information reviewed  Ports checked and MVCT/IGRT images checked    Medication Review  Med list reviewed with patient?: Yes  Med list printed and given: Offered and declined  Med Note: Eligard 45 mg injection received on 12/8/2023 with Dr. Galindo    Educational Topic Discussed  Additional Instructions: follow-up with Dr. Escobar in approximately 3 months with PSA - scheduling will contact patient  Education Instructions: radiation therapy side effects: fatigue, skin changes and skin cares, nausea/vomiting, diarrhea, urinary and bladder changes      Yuniel Escobar MD  Radiation Oncology   [facility]  Clinic: [phone]

## 2024-03-20 NOTE — PATIENT INSTRUCTIONS
Please contact [facility] RN with questions or concerns following today's appointment: [phone].       Please feel free to leave a detailed message if your call is not answered.    If your call is not received before 3:00 PM, it may not be returned until the following business day.    If you are receiving radiation treatment and need assistance after 3:00 PM or on the weekends, please call [phone] and ask to speak to the radiation oncologist on-call.    Thank you!    Kassandra TURNER

## 2024-03-21 ENCOUNTER — APPOINTMENT (OUTPATIENT)
Dept: RADIATION ONCOLOGY | Facility: CLINIC | Age: 69
End: 2024-03-21
Payer: COMMERCIAL

## 2024-03-21 PROCEDURE — 77385 PR IMRT TREATMENT DELIVERY, SIMPLE: CPT | Performed by: SURGERY

## 2024-03-21 PROCEDURE — 77014 PR CT GUIDE FOR PLACEMENT RADIATION THERAPY FIELDS: CPT | Performed by: SURGERY

## 2024-03-22 ENCOUNTER — APPOINTMENT (OUTPATIENT)
Dept: RADIATION ONCOLOGY | Facility: CLINIC | Age: 69
End: 2024-03-22
Payer: COMMERCIAL

## 2024-03-22 ENCOUNTER — OFFICE VISIT (OUTPATIENT)
Dept: RADIATION ONCOLOGY | Facility: CLINIC | Age: 69
End: 2024-03-22
Payer: COMMERCIAL

## 2024-03-22 VITALS
DIASTOLIC BLOOD PRESSURE: 85 MMHG | SYSTOLIC BLOOD PRESSURE: 146 MMHG | OXYGEN SATURATION: 99 % | BODY MASS INDEX: 28.23 KG/M2 | HEART RATE: 67 BPM | WEIGHT: 188.4 LBS | RESPIRATION RATE: 16 BRPM

## 2024-03-22 DIAGNOSIS — C61 PROSTATE CANCER (H): Primary | ICD-10-CM

## 2024-03-22 PROCEDURE — 77385 PR IMRT TREATMENT DELIVERY, SIMPLE: CPT | Performed by: SURGERY

## 2024-03-22 PROCEDURE — 99207 PR DROP WITH A PROCEDURE: CPT | Performed by: SURGERY

## 2024-03-22 PROCEDURE — 77014 PR CT GUIDE FOR PLACEMENT RADIATION THERAPY FIELDS: CPT | Performed by: SURGERY

## 2024-03-22 NOTE — PROGRESS NOTES
[facility] PHYSICIANS  SPECIALIZING IN BREAKTHROUGHS  Radiation Oncology    On Treatment Visit Note      Jose Alejandro Eason      Date: Mar 22, 2024   MRN: [id number]   : 1955  Diagnosis: prostate cancer      ID: Mr. Eason is a 68 year old male who was is on active surveillance for low risk prostate cancer ( cT1c,PSA 6.99, GS 3+3=6, ) now presenting with progression to unfavorable, intermediate risk prostate cancer (cT1c, PSA 11.7, GS 3+4=7 (3 cores positive with 1% of kt 4). Metastatic work up is negative for LAD or distant disease.      Reason for Visit:  On Radiation Treatment Visit       Treatment Summary to Date  Treatment Site: prostate + SV Current Dose: 6750/7000 cGy Fractions:         Chemotherapy  Chemo concurrent with radx?: No    Subjective:   No complaints, tolerating RT well overall. Mild dysuria, self resolving with hydration. Stable.    Nursing ROS:   Nutrition Alteration  Diet Type: Patient's Preference  Skin  Skin Reaction: 0 - No changes        Cardiovascular  Respiratory effort: 1 - Normal - without distress  Gastrointestinal  Nausea: 0 - None  Diarrhea: 0 - None  Genitourinary  Urinary Status: 0 - Normal   Note: WNL  Psychosocial  Mood - Anxiety: 0 - Normal  Mood - Depression: 0 - Normal  Psychosocial Note: energy level at baseline  Pain Assessment  0-10 Pain Scale: 0      Objective:   BP (!) 146/85   Pulse 67   Resp 16   Wt 85.5 kg (188 lb 6.4 oz)   SpO2 99%   BMI 28.23 kg/m    Gen: Appears well, in no acute distress  Skin: No erythema  CV/Resp: rrr, breathing comfortably on room air  Neuro: CN 2-12 grossly intact, UE/LE full strength     Labs:  CBC RESULTS:   Recent Labs   Lab Test 19  1708   WBC 6.8   RBC 4.04*   HGB 12.1*   HCT 36.8*   MCV 91   MCH 30.0   MCHC 32.9   RDW 13.9        ELECTROLYTES:  Recent Labs   Lab Test 17  0900   CR 0.95       Assessment:    Tolerating radiation therapy well.  All questions and concerns  addressed.      Plan:   Continue current therapy.    Follow up for toxicity check and PSA at 3 months post RT      Mosaiq chart and setup information reviewed  Ports checked and MVCT/IGRT images checked    Medication Review  Med list reviewed with patient?: Yes  Med Note: Eligard 45 mg injection received on 12/8/2023 with Dr. Galindo    Educational Topic Discussed  Additional Instructions: follow-up with Dr. Escobar in approximately 3 months with PSA - scheduling will contact patient  Education Instructions: radiation therapy side effects: fatigue, skin changes and skin cares, nausea/vomiting, diarrhea, urinary and bladder changes      Yuniel Escobar MD  Radiation Oncology   [facility]  Clinic: [phone]

## 2024-03-22 NOTE — LETTER
3/22/2024         RE: Jose Alejandro Eason  [address] [place] [address]        Dear Colleague,    Thank you for referring your patient, Jose Alejandro Eason, to the [facility] RADIATION ONCOLOGY [place]. Please see a copy of my visit note below.    [facility] PHYSICIANS  SPECIALIZING IN BREAKTHROUGHS  Radiation Oncology    On Treatment Visit Note      Jose Alejandro Eason      Date: Mar 22, 2024   MRN: [id number]   : 1955  Diagnosis: prostate cancer      ID: Mr. Eason is a 68 year old male who was is on active surveillance for low risk prostate cancer ( cT1c,PSA 6.99, GS 3+3=6, ) now presenting with progression to unfavorable, intermediate risk prostate cancer (cT1c, PSA 11.7, GS 3+4=7 (3 cores positive with 1% of kt 4). Metastatic work up is negative for LAD or distant disease.      Reason for Visit:  On Radiation Treatment Visit       Treatment Summary to Date  Treatment Site: prostate + SV Current Dose: 6750/7000 cGy Fractions:         Chemotherapy  Chemo concurrent with radx?: No    Subjective:   No complaints, tolerating RT well overall. Mild dysuria, self resolving with hydration. Stable.    Nursing ROS:   Nutrition Alteration  Diet Type: Patient's Preference  Skin  Skin Reaction: 0 - No changes        Cardiovascular  Respiratory effort: 1 - Normal - without distress  Gastrointestinal  Nausea: 0 - None  Diarrhea: 0 - None  Genitourinary  Urinary Status: 0 - Normal   Note: WNL  Psychosocial  Mood - Anxiety: 0 - Normal  Mood - Depression: 0 - Normal  Psychosocial Note: energy level at baseline  Pain Assessment  0-10 Pain Scale: 0      Objective:   BP (!) 146/85   Pulse 67   Resp 16   Wt 85.5 kg (188 lb 6.4 oz)   SpO2 99%   BMI 28.23 kg/m    Gen: Appears well, in no acute distress  Skin: No erythema  CV/Resp: rrr, breathing comfortably on room air  Neuro: CN 2-12 grossly intact, UE/LE full strength     Labs:  CBC RESULTS:   Recent Labs   Lab Test  06/13/19  1708   WBC 6.8   RBC 4.04*   HGB 12.1*   HCT 36.8*   MCV 91   MCH 30.0   MCHC 32.9   RDW 13.9        ELECTROLYTES:  Recent Labs   Lab Test 12/01/17  0900   CR 0.95       Assessment:    Tolerating radiation therapy well.  All questions and concerns addressed.      Plan:   Continue current therapy.    Follow up for toxicity check and PSA at 3 months post RT      Mosaiq chart and setup information reviewed  Ports checked and MVCT/IGRT images checked    Medication Review  Med list reviewed with patient?: Yes  Med Note: Eligard 45 mg injection received on 12/8/2023 with Dr. Galindo    Educational Topic Discussed  Additional Instructions: follow-up with Dr. Escobar in approximately 3 months with PSA - scheduling will contact patient  Education Instructions: radiation therapy side effects: fatigue, skin changes and skin cares, nausea/vomiting, diarrhea, urinary and bladder changes      Yuniel Escobar MD  Radiation Oncology   [facility]  Clinic: [phone]        Again, thank you for allowing me to participate in the care of your patient.        Sincerely,        Yuniel Escobar MD

## 2024-03-25 ENCOUNTER — DOCUMENTATION ONLY (OUTPATIENT)
Dept: RADIATION ONCOLOGY | Facility: CLINIC | Age: 69
End: 2024-03-25
Payer: COMMERCIAL

## 2024-03-25 ENCOUNTER — APPOINTMENT (OUTPATIENT)
Dept: RADIATION ONCOLOGY | Facility: CLINIC | Age: 69
End: 2024-03-25
Payer: COMMERCIAL

## 2024-03-25 PROCEDURE — 77014 PR CT GUIDE FOR PLACEMENT RADIATION THERAPY FIELDS: CPT | Performed by: RADIOLOGY

## 2024-03-25 PROCEDURE — 99207 PR NO BILLABLE SERVICE THIS VISIT: CPT | Performed by: SURGERY

## 2024-03-25 PROCEDURE — 77427 RADIATION TX MANAGEMENT X5: CPT | Performed by: SURGERY

## 2024-03-25 PROCEDURE — 77385 PR IMRT TREATMENT DELIVERY, SIMPLE: CPT | Performed by: RADIOLOGY

## 2024-03-25 PROCEDURE — 77336 RADIATION PHYSICS CONSULT: CPT | Performed by: RADIOLOGY

## 2024-04-10 NOTE — PROGRESS NOTES
Radiotherapy Treatment Summary              PATIENT: Jose Alejandro Eason  MEDICAL RECORD NO: [id number]   : 1955    DIAGNOSIS / ID: Mr. Eason is a 68 year old male who was is on active surveillance for low risk prostate cancer ( cT1c,PSA 6.99, GS 3+3=6, ) now presenting with progression to unfavorable, intermediate risk prostate cancer (cT1c, PSA 11.7, GS 3+4=7 (3 cores positive with 1% of tk 4). Metastatic work up is negative for LAD or distant disease.      INTENT OF RADIOTHERAPY: Definitive    CONCURRENT SYSTEMIC THERAPY: ADT          SITE OF TREATMENT: prostate and proximal SV    DATES  OF TREATMENT: 2/15/24-3/25/24    TOTAL DOSE OF TREATMENT / FRACTIONS: 70Gy/28fx                                          FOLLOW UP PLAN:  Follow up with Radiation Oncology in 3 months for toxicity check and PSA      CC  Patient Care Team:  No Ref-Primary, Physician as PCP - General  Joey Galindo MD as Assigned Surgical Provider  Yuniel Escobar MD as MD (Radiation Oncology)  Kassandra Steinberg RN as Specialty Care Coordinator (Radiation Oncology)  Yuniel Escobar MD as Assigned Cancer Care Provider  Joey Galindo MD as MD (Urology)       Yuniel Escobar M.D.  Department of Radiation Oncology  [facility]

## 2024-06-13 ENCOUNTER — LAB (OUTPATIENT)
Dept: LAB | Facility: CLINIC | Age: 69
End: 2024-06-13
Payer: COMMERCIAL

## 2024-06-13 DIAGNOSIS — C61 PROSTATE CANCER (H): ICD-10-CM

## 2024-06-13 LAB — PSA SERPL DL<=0.01 NG/ML-MCNC: 0.81 NG/ML (ref 0–4.5)

## 2024-06-13 PROCEDURE — 36415 COLL VENOUS BLD VENIPUNCTURE: CPT

## 2024-06-13 PROCEDURE — 84153 ASSAY OF PSA TOTAL: CPT

## 2024-06-17 ENCOUNTER — OFFICE VISIT (OUTPATIENT)
Dept: RADIATION ONCOLOGY | Facility: CLINIC | Age: 69
End: 2024-06-17
Payer: COMMERCIAL

## 2024-06-17 VITALS
SYSTOLIC BLOOD PRESSURE: 140 MMHG | DIASTOLIC BLOOD PRESSURE: 85 MMHG | HEART RATE: 61 BPM | OXYGEN SATURATION: 97 % | TEMPERATURE: 97.8 F | WEIGHT: 191.1 LBS | BODY MASS INDEX: 28.63 KG/M2 | RESPIRATION RATE: 18 BRPM

## 2024-06-17 DIAGNOSIS — C61 PROSTATE CANCER (H): Primary | ICD-10-CM

## 2024-06-17 PROCEDURE — 99024 POSTOP FOLLOW-UP VISIT: CPT | Performed by: SURGERY

## 2024-06-17 ASSESSMENT — PAIN SCALES - GENERAL: PAINLEVEL: NO PAIN (0)

## 2024-06-17 NOTE — LETTER
2024      Jose Alejandro Eason  [address] [facility] [address]      Dear Colleague,    Thank you for referring your patient, Jose Alejandro Eason, to the [facility] RADIATION ONCOLOGY [place]. Please see a copy of my visit note below.         Department of Radiation Oncology  [facility]: Cancer Center  [facility]  [address] [address] [place] [address] [phone]       Radiation Oncology Follow-up Visit  2024      Jose Alejandro Eason  MRN: [id number]   : 1955     DIAGNOSIS / ID: Mr. Eason is a 68 year old male who was is on active surveillance for low risk prostate cancer ( cT1c,PSA 6.99, GS 3+3=6, ) now presenting with progression to unfavorable, intermediate risk prostate cancer (cT1c, PSA 11.7, GS 3+4=7 (3 cores positive with 1% of kt 4). Metastatic work up is negative for LAD or distant disease.      INTENT OF RADIOTHERAPY: Definitive     CONCURRENT SYSTEMIC THERAPY: ADT           SITE OF TREATMENT: prostate and proximal SV     DATES  OF TREATMENT: 2/15/24-3/25/24     TOTAL DOSE OF TREATMENT / FRACTIONS: 70Gy/28fx     INTERVAL SINCE COMPLETION OF RADIATION THERAPY: 3  months    SUBJECTIVE:   Overall, patient is doing well since completion of radiation therapy. Denies any new GI/ symptoms and energy levels have returned. Of note, he plans to potentially relocate by to [facility] in the next year or two.    IPSS:     QoL: 0    ALTON:     PHYSICAL EXAM:  BP (!) 140/85   Pulse 61   Temp 97.8  F (36.6  C) (Oral)   Resp 18   Wt 86.7 kg (191 lb 1.6 oz)   SpO2 97%   BMI 28.63 kg/m    Gen: Alert, in NAD  Eyes: PERRL, EOMI, sclera anicteric  HENT     Head: NC/AT     Ears: No external auricular lesions     Nose/sinus: No rhinorrhea or epistaxis     Oral Cavity/Oropharynx: MMM  Neck: Supple, full ROM, no LAD  Pulm: No wheezing, stridor or respiratory distress  CV: Well-perfused, no cyanosis, no pedal edema  Abdominal:  Soft, nontender, nondistended, no hepatomegaly  Back: No step-offs or pain to palpation along the thoracolumbar spine, no CVA tenderness  Rectal/: Deferred  Musculoskeletal: Normal bulk and tone   Skin: Normal color and turgor  Neurologic: A/Ox3, CN II-XII intact  Psychiatric: Appropriate mood and affect    LABS AND IMAGING:  PSA   Date Value Ref Range Status   09/20/2018 9.71 (H) 0 - 4 ug/L Final     Comment:     Assay Method:  Chemiluminescence using Siemens Vista analyzer   12/01/2017 6.99 (H) 0 - 4 ug/L Final     Comment:     Assay Method:  Chemiluminescence using Siemens Vista analyzer     PSA Tumor Marker   Date Value Ref Range Status   06/13/2024 0.81 0.00 - 4.50 ng/mL Final     Comment:     Results confirmed by repeat test.    07/21/2023 10.70 (H) 0.00 - 4.50 ng/mL Final   11/11/2022 11.70 (H) 0.00 - 4.00 ug/L Final         IMPRESSION:Patient is doing well from a acute toxicity perspective.  PSA has responded, current value of 0.81. Re-discussed outcomes of prostate cancer, biochemical responses after EBRT for prostate cancer.     PLAN:   PSA in 6 months    Yuniel Escobar M.D.  Department of Radiation Oncology  [facility]     A total of 40 minutes were spent on this visit, including preparation for this visit, face to face with patient, and medical decision making. Medical decision-making included consideration and possible diagnosis, management options, complex record review, review of diagnostic tests, consideration and discussion of significant complications based up medical history/comorbidities, and discussion with providers involved in care of the patient.       Again, thank you for allowing me to participate in the care of your patient.        Sincerely,        Yuniel Escobar MD

## 2024-06-17 NOTE — NURSING NOTE
FOLLOW-UP VISIT    Patient Name: Jose Alejandro Eason      : 1955     Age: 68 year old        ______________________________________________________________________________     Chief Complaint   Patient presents with    Radiation Therapy     Return appointment with Dr. Escobar      BP (!) 140/85   Pulse 61   Temp 97.8  F (36.6  C) (Oral)   Resp 18   Wt 86.7 kg (191 lb 1.6 oz)   SpO2 97%   BMI 28.63 kg/m       Date Radiation Completed: 7,000 cGy to prostate + SV completed on 3/25/24    Pain  Denies    Meds  Current Med List Reviewed: Yes  Medication Note: Eligard 45 mg on 24    AUA: AUA Score: 2 (24 1500)  ALTON: ALTON Score: 24 (24 1500)    PSA   Date Value Ref Range Status   2018 9.71 (H) 0 - 4 ug/L Final     Comment:     Assay Method:  Chemiluminescence using Siemens Vista analyzer   2017 6.99 (H) 0 - 4 ug/L Final     Comment:     Assay Method:  Chemiluminescence using Siemens Vista analyzer     PSA Tumor Marker   Date Value Ref Range Status   2024 0.81 0.00 - 4.50 ng/mL Final     Comment:     Results confirmed by repeat test.    2023 10.70 (H) 0.00 - 4.50 ng/mL Final   2022 11.70 (H) 0.00 - 4.00 ug/L Final       Bowel: Normal- patient drinking African tea to prevent constipation     Bladder: nocturia  Nocturia: 1 - Once every 8 hours    Energy Level: normal    Appointments:   Urologist:       Other Notes: Follow up per Dr. Escobar.    Daisy Lara RN

## 2024-06-18 NOTE — PROGRESS NOTES
Department of Radiation Oncology  [facility]    Health: Cancer Center  [facility]  [address] [place] [address] [phone]       Radiation Oncology Follow-up Visit  2024      Jose Alejandro Eason  MRN: [id number]   : 1955     DIAGNOSIS / ID: Mr. Eason is a 68 year old male who was is on active surveillance for low risk prostate cancer ( cT1c,PSA 6.99, GS 3+3=6, ) now presenting with progression to unfavorable, intermediate risk prostate cancer (cT1c, PSA 11.7, GS 3+4=7 (3 cores positive with 1% of kt 4). Metastatic work up is negative for LAD or distant disease.      INTENT OF RADIOTHERAPY: Definitive     CONCURRENT SYSTEMIC THERAPY: ADT           SITE OF TREATMENT: prostate and proximal SV     DATES  OF TREATMENT: 2/15/24-3/25/24     TOTAL DOSE OF TREATMENT / FRACTIONS: 70Gy/28fx     INTERVAL SINCE COMPLETION OF RADIATION THERAPY: 3  months    SUBJECTIVE:   Overall, patient is doing well since completion of radiation therapy. Denies any new GI/ symptoms and energy levels have returned. Of note, he plans to potentially relocate by to [facility] in the next year or two.    IPSS:     QoL: 0    ALTON:     PHYSICAL EXAM:  BP (!) 140/85   Pulse 61   Temp 97.8  F (36.6  C) (Oral)   Resp 18   Wt 86.7 kg (191 lb 1.6 oz)   SpO2 97%   BMI 28.63 kg/m    Gen: Alert, in NAD  Eyes: PERRL, EOMI, sclera anicteric  HENT     Head: NC/AT     Ears: No external auricular lesions     Nose/sinus: No rhinorrhea or epistaxis     Oral Cavity/Oropharynx: MMM  Neck: Supple, full ROM, no LAD  Pulm: No wheezing, stridor or respiratory distress  CV: Well-perfused, no cyanosis, no pedal edema  Abdominal: Soft, nontender, nondistended, no hepatomegaly  Back: No step-offs or pain to palpation along the thoracolumbar spine, no CVA tenderness  Rectal/: Deferred  Musculoskeletal: Normal bulk and tone   Skin: Normal color and turgor  Neurologic: A/Ox3,  CN II-XII intact  Psychiatric: Appropriate mood and affect    LABS AND IMAGING:  PSA   Date Value Ref Range Status   09/20/2018 9.71 (H) 0 - 4 ug/L Final     Comment:     Assay Method:  Chemiluminescence using Siemens Vista analyzer   12/01/2017 6.99 (H) 0 - 4 ug/L Final     Comment:     Assay Method:  Chemiluminescence using Siemens Vista analyzer     PSA Tumor Marker   Date Value Ref Range Status   06/13/2024 0.81 0.00 - 4.50 ng/mL Final     Comment:     Results confirmed by repeat test.    07/21/2023 10.70 (H) 0.00 - 4.50 ng/mL Final   11/11/2022 11.70 (H) 0.00 - 4.00 ug/L Final         IMPRESSION:Patient is doing well from a acute toxicity perspective.  PSA has responded, current value of 0.81. Re-discussed outcomes of prostate cancer, biochemical responses after EBRT for prostate cancer.     PLAN:   PSA in 6 months    Yuniel Escobar M.D.  Department of Radiation Oncology  [facility]     A total of 40 minutes were spent on this visit, including preparation for this visit, face to face with patient, and medical decision making. Medical decision-making included consideration and possible diagnosis, management options, complex record review, review of diagnostic tests, consideration and discussion of significant complications based up medical history/comorbidities, and discussion with providers involved in care of the patient.

## 2024-09-18 ENCOUNTER — PATIENT OUTREACH (OUTPATIENT)
Dept: RADIATION ONCOLOGY | Facility: CLINIC | Age: 69
End: 2024-09-18
Payer: COMMERCIAL

## 2024-09-18 NOTE — PROGRESS NOTES
Received notification that patient left a message with radiation therapists that is requesting a return call from this RN related to new symptoms.  This RN attempted contact with patient today, 9/18/2024, no answer and voice message left requesting return call to this RN at [phone].  Patient received 7,000 cGy to prostate and SV and completed radiation therapy on 3/25/2024.    Kassandra Monaco, RN BSN OCN CBCN

## 2024-09-24 ENCOUNTER — PATIENT OUTREACH (OUTPATIENT)
Dept: RADIATION ONCOLOGY | Facility: CLINIC | Age: 69
End: 2024-09-24
Payer: COMMERCIAL

## 2024-09-24 NOTE — PROGRESS NOTES
"Received telephone call from patient reporting that in the past couple of weeks he had blood in his stool after eating spicy foods, resolved.  Patient reports he again woke up this morning and noticed \"some tiny blood spots in my pants\".  Patient is requesting a visit with Dr. Escobar to further review.  Patient offered a return visit with Dr. Escobar this Thursday, 9/26/2024 at 1100 at [facility] and patient accepted appointment and confirmed appointment details.  Patient had no further questions at this time.    Kassandra Monaco, RN BSN OCN CBCN    "

## 2024-09-26 ENCOUNTER — OFFICE VISIT (OUTPATIENT)
Dept: RADIATION ONCOLOGY | Facility: CLINIC | Age: 69
End: 2024-09-26
Payer: COMMERCIAL

## 2024-09-26 VITALS
DIASTOLIC BLOOD PRESSURE: 94 MMHG | HEART RATE: 61 BPM | RESPIRATION RATE: 16 BRPM | BODY MASS INDEX: 29.16 KG/M2 | OXYGEN SATURATION: 98 % | TEMPERATURE: 97.8 F | WEIGHT: 194.6 LBS | SYSTOLIC BLOOD PRESSURE: 163 MMHG

## 2024-09-26 DIAGNOSIS — C61 PROSTATE CANCER (H): Primary | ICD-10-CM

## 2024-09-26 PROCEDURE — 99214 OFFICE O/P EST MOD 30 MIN: CPT | Performed by: SURGERY

## 2024-09-26 PROCEDURE — G2211 COMPLEX E/M VISIT ADD ON: HCPCS | Performed by: SURGERY

## 2024-09-26 ASSESSMENT — PAIN SCALES - GENERAL: PAINLEVEL: NO PAIN (0)

## 2024-09-26 NOTE — NURSING NOTE
"Oncology Rooming Note    September 26, 2024 9:45 AM   Jose Alejandro Eason is a 68 year old male who presents for:    Chief Complaint   Patient presents with    Cancer     Radiation Oncology Return Visit with Dr. Escobar.     Initial Vitals: There were no vitals taken for this visit. Estimated body mass index is 28.63 kg/m  as calculated from the following:    Height as of 6/13/19: 1.74 m (5' 8.5\").    Weight as of 6/17/24: 86.7 kg (191 lb 1.6 oz). There is no height or weight on file to calculate BSA.  Data Unavailable Comment: Data Unavailable   No LMP for male patient.  Allergies reviewed: Yes  Medications reviewed: Yes    Medications: Medication refills not needed today.  Pharmacy name entered into [employer]:    [facility] [id number] [place] [facility] [address] [facility]/PHARMACY [address]86 [place], [place] - [address] [facility]    Frailty Screening:   Is the patient here for a new oncology consult visit in cancer care? 2. No      Clinical concerns: Patient has concerns of  blood in his stools.  Dr. Escobar was notified.      Bobbi Scott             "

## 2024-09-26 NOTE — PROGRESS NOTES
Department of Radiation Oncology  [facility]    Health: Cancer Center  [facility]  [id number] [place] [address] [phone]       Radiation Oncology Follow-up Visit  Sep 26, 2024      Jose Alejandro Eason  MRN: [id number]   : 1955     DIAGNOSIS / ID: Mr. Eason is a 68 year old male who was is on active surveillance for low risk prostate cancer ( cT1c,PSA 6.99, GS 3+3=6, ) now presenting with progression to unfavorable, intermediate risk prostate cancer (cT1c, PSA 11.7, GS 3+4=7 (3 cores positive with 1% of kt 4). Metastatic work up is negative for LAD or distant disease.      INTENT OF RADIOTHERAPY: Definitive     CONCURRENT SYSTEMIC THERAPY: ADT           SITE OF TREATMENT: prostate and proximal SV     DATES  OF TREATMENT: 2/15/24-3/25/24     TOTAL DOSE OF TREATMENT / FRACTIONS: 70Gy/28fx     INTERVAL SINCE COMPLETION OF RADIATION THERAPY: 6  months    SUBJECTIVE:   Patient was last seen 3 months ago. He is now 6 months post radiation therapy. Patient request to be seen earlier due to new symptoms. Two weeks ago, he had two episodes of blood clot per rectum, no pain, no interventions. This has self-resolved, and he is no longer having this. He did also endorse he feels it may be related to something he ate. This has since resolved. He also did endorse a one time episode of hematuria which has resolved. Denies any new GI/ symptoms and energy levels have returned. Of note, he plans to potentially relocate by to [facility] in the next year or two.    IPSS: 35    QoL: 0    ALTON: 1825    PHYSICAL EXAM:  BP (!) 163/94 (BP Location: Left arm, Patient Position: Sitting, Cuff Size: Adult Regular)   Pulse 61   Temp 97.8  F (36.6  C) (Oral)   Resp 16   Wt 88.3 kg (194 lb 9.6 oz)   SpO2 98%   BMI 29.16 kg/m    Gen: Alert, in NAD  Eyes: PERRL, EOMI, sclera anicteric  HENT     Head: NC/AT     Ears: No external auricular lesions     Nose/sinus:  No rhinorrhea or epistaxis     Oral Cavity/Oropharynx: MMM  Neck: Supple, full ROM, no LAD  Pulm: No wheezing, stridor or respiratory distress  CV: Well-perfused, no cyanosis, no pedal edema  Abdominal: Soft, nontender, nondistended, no hepatomegaly  Back: No step-offs or pain to palpation along the thoracolumbar spine, no CVA tenderness  Rectal/: Deferred  Musculoskeletal: Normal bulk and tone   Skin: Normal color and turgor  Neurologic: A/Ox3, CN II-XII intact  Psychiatric: Appropriate mood and affect    LABS AND IMAGING:  PSA   Date Value Ref Range Status   09/20/2018 9.71 (H) 0 - 4 ug/L Final     Comment:     Assay Method:  Chemiluminescence using Siemens Vista analyzer   12/01/2017 6.99 (H) 0 - 4 ug/L Final     Comment:     Assay Method:  Chemiluminescence using Siemens Vista analyzer     PSA Tumor Marker   Date Value Ref Range Status   06/13/2024 0.81 0.00 - 4.50 ng/mL Final     Comment:     Results confirmed by repeat test.    07/21/2023 10.70 (H) 0.00 - 4.50 ng/mL Final   11/11/2022 11.70 (H) 0.00 - 4.00 ug/L Final         IMPRESSION:Patient is doing well from a acute toxicity perspective and his recent symptoms have self resolved, no further interventions required.  PSA has responded, current value of 0.81.     PLAN:   Patient will RTC in 3 months with PSA    Yuniel Escobar M.D.  Department of Radiation Oncology  [facility]     A total of 30 minutes were spent on this visit, including preparation for this visit, face to face with patient, and medical decision making. Medical decision-making included consideration and possible diagnosis, management options, complex record review, review of diagnostic tests, consideration and discussion of significant complications based up medical history/comorbidities, and discussion with providers involved in care of the patient.

## 2024-09-26 NOTE — LETTER
2024      Jose Alejandro Eason  [address] [facility] [address]      Dear Colleague,    Thank you for referring your patient, Jose Alejandro Eason, to the [facility] RADIATION ONCOLOGY [place]. Please see a copy of my visit note below.         Department of Radiation Oncology  [facility]: Cancer Center  [facility]  [address] [address] [place] [address] [phone]       Radiation Oncology Follow-up Visit  Sep 26, 2024      Jose Alejandro Eason  MRN: [id number]   : 1955     DIAGNOSIS / ID: Mr. Eason is a 68 year old male who was is on active surveillance for low risk prostate cancer ( cT1c,PSA 6.99, GS 3+3=6, ) now presenting with progression to unfavorable, intermediate risk prostate cancer (cT1c, PSA 11.7, GS 3+4=7 (3 cores positive with 1% of kt 4). Metastatic work up is negative for LAD or distant disease.      INTENT OF RADIOTHERAPY: Definitive     CONCURRENT SYSTEMIC THERAPY: ADT           SITE OF TREATMENT: prostate and proximal SV     DATES  OF TREATMENT: 2/15/24-3/25/24     TOTAL DOSE OF TREATMENT / FRACTIONS: 70Gy/28fx     INTERVAL SINCE COMPLETION OF RADIATION THERAPY: 6  months    SUBJECTIVE:   Patient was last seen 3 months ago. He is now 6 months post radiation therapy. Patient request to be seen earlier due to new symptoms. Two weeks ago, he had two episodes of blood clot per rectum, no pain, no interventions. This has self-resolved, and he is no longer having this. He did also endorse he feels it may be related to something he ate. This has since resolved. He also did endorse a one time episode of hematuria which has resolved. Denies any new GI/ symptoms and energy levels have returned. Of note, he plans to potentially relocate by to [facility] in the next year or two.    IPSS:     QoL: 0    ALTON: 1825    PHYSICAL EXAM:  BP (!) 163/94 (BP Location: Left arm, Patient Position: Sitting, Cuff Size: Adult Regular)    Pulse 61   Temp 97.8  F (36.6  C) (Oral)   Resp 16   Wt 88.3 kg (194 lb 9.6 oz)   SpO2 98%   BMI 29.16 kg/m    Gen: Alert, in NAD  Eyes: PERRL, EOMI, sclera anicteric  HENT     Head: NC/AT     Ears: No external auricular lesions     Nose/sinus: No rhinorrhea or epistaxis     Oral Cavity/Oropharynx: MMM  Neck: Supple, full ROM, no LAD  Pulm: No wheezing, stridor or respiratory distress  CV: Well-perfused, no cyanosis, no pedal edema  Abdominal: Soft, nontender, nondistended, no hepatomegaly  Back: No step-offs or pain to palpation along the thoracolumbar spine, no CVA tenderness  Rectal/: Deferred  Musculoskeletal: Normal bulk and tone   Skin: Normal color and turgor  Neurologic: A/Ox3, CN II-XII intact  Psychiatric: Appropriate mood and affect    LABS AND IMAGING:  PSA   Date Value Ref Range Status   09/20/2018 9.71 (H) 0 - 4 ug/L Final     Comment:     Assay Method:  Chemiluminescence using Siemens Vista analyzer   12/01/2017 6.99 (H) 0 - 4 ug/L Final     Comment:     Assay Method:  Chemiluminescence using Siemens Vista analyzer     PSA Tumor Marker   Date Value Ref Range Status   06/13/2024 0.81 0.00 - 4.50 ng/mL Final     Comment:     Results confirmed by repeat test.    07/21/2023 10.70 (H) 0.00 - 4.50 ng/mL Final   11/11/2022 11.70 (H) 0.00 - 4.00 ug/L Final         IMPRESSION:Patient is doing well from a acute toxicity perspective and his recent symptoms have self resolved, no further interventions required.  PSA has responded, current value of 0.81.     PLAN:   Patient will RTC in 3 months with PSA    Yuniel Escobar M.D.  Department of Radiation Oncology  [facility]     A total of 30 minutes were spent on this visit, including preparation for this visit, face to face with patient, and medical decision making. Medical decision-making included consideration and possible diagnosis, management options, complex record review, review of diagnostic tests, consideration and discussion of  significant complications based up medical history/comorbidities, and discussion with providers involved in care of the patient.       Again, thank you for allowing me to participate in the care of your patient.        Sincerely,        Yuniel Escobar MD

## 2024-09-26 NOTE — PATIENT INSTRUCTIONS
Please contact [facility] RN with questions or concerns following today's appointment.    If you are a patient of Dr. Escobar call: [phone]   If you are a patient of Dr. Garcia call: [phone]    Please feel free to leave a detailed message if your call is not answered.    If your call is not received before 3:00 PM, it may not be returned until the following business day.    If you are receiving radiation treatment and need assistance after 3:00 PM or on the weekends, please call [phone] and ask to speak to the radiation oncologist on-call.    Thank you!    [facility] Radiation Oncology Nursing

## 2024-10-18 ENCOUNTER — OFFICE VISIT (OUTPATIENT)
Dept: RADIATION ONCOLOGY | Facility: CLINIC | Age: 69
End: 2024-10-18
Payer: COMMERCIAL

## 2024-10-18 VITALS
SYSTOLIC BLOOD PRESSURE: 148 MMHG | HEART RATE: 61 BPM | BODY MASS INDEX: 29.29 KG/M2 | DIASTOLIC BLOOD PRESSURE: 95 MMHG | OXYGEN SATURATION: 100 % | RESPIRATION RATE: 16 BRPM | TEMPERATURE: 97.8 F | WEIGHT: 195.5 LBS

## 2024-10-18 DIAGNOSIS — C61 PROSTATE CANCER (H): Primary | ICD-10-CM

## 2024-10-18 PROCEDURE — 99214 OFFICE O/P EST MOD 30 MIN: CPT | Performed by: SURGERY

## 2024-10-18 PROCEDURE — 99207 PR NO CHARGE LOS: CPT | Performed by: SURGERY

## 2024-10-18 PROCEDURE — G2211 COMPLEX E/M VISIT ADD ON: HCPCS | Performed by: SURGERY

## 2024-10-18 ASSESSMENT — PAIN SCALES - GENERAL: PAINLEVEL: MILD PAIN (2)

## 2024-10-18 NOTE — LETTER
10/18/2024      Jose Alejandro Eason  [address] [facility] [address]      Dear Colleague,    Thank you for referring your patient, Jose Alejandro Eason, to the [facility] RADIATION ONCOLOGY [place]. Please see a copy of my visit note below.         Department of Radiation Oncology  [facility]: Cancer Center  [facility]  [address] [address] [place] [address] [phone]       Radiation Oncology Follow-up Visit  Oct 18, 2024      Jose Alejandro Eason  MRN: [id number]   : 1955     DIAGNOSIS / ID: Mr. Eason is a 69 year old male who was is on active surveillance for low risk prostate cancer ( cT1c,PSA 6.99, GS 3+3=6, ) now presenting with progression to unfavorable, intermediate risk prostate cancer (cT1c, PSA 11.7, GS 3+4=7 (3 cores positive with 1% of kt 4). Metastatic work up is negative for LAD or distant disease.      INTENT OF RADIOTHERAPY: Definitive     CONCURRENT SYSTEMIC THERAPY: ADT           SITE OF TREATMENT: prostate and proximal SV     DATES  OF TREATMENT: 2/15/24-3/25/24     TOTAL DOSE OF TREATMENT / FRACTIONS: 70Gy/28fx     INTERVAL SINCE COMPLETION OF RADIATION THERAPY: 6  months    SUBJECTIVE:   Patient was last seen a few week ago . He is now 6 months post radiation therapy.     Patient request to be seen earlier due to new symptoms. 1  month ago weeks ago, he had two episodes of blood clot per rectum, no pain, no interventions. This had self-resolved, and he is no longer having this. He did also endorse he feels it may be related to something he ate. This has since resolved.    He, however, is still having mild blood clots per rectum, not currently performing any intervention.  Denies any new GI/ symptoms and energy levels have returned. Of note, he plans to potentially relocate by to [facility] in the next year or two.    IPSS:     QoL: 0    ALTON:     PHYSICAL EXAM:  BP (!) 148/95 (BP Location: Left arm, Patient  Position: Chair, Cuff Size: Adult Regular)   Pulse 61   Temp 97.8  F (36.6  C) (Oral)   Resp 16   Wt 88.7 kg (195 lb 8 oz)   SpO2 100%   BMI 29.29 kg/m    Gen: Alert, in NAD  Eyes: PERRL, EOMI, sclera anicteric  HENT     Head: NC/AT     Ears: No external auricular lesions     Nose/sinus: No rhinorrhea or epistaxis     Oral Cavity/Oropharynx: MMM  Neck: Supple, full ROM, no LAD  Pulm: No wheezing, stridor or respiratory distress  CV: Well-perfused, no cyanosis, no pedal edema  Abdominal: Soft, nontender, nondistended, no hepatomegaly  Back: No step-offs or pain to palpation along the thoracolumbar spine, no CVA tenderness  Rectal/: Deferred  Musculoskeletal: Normal bulk and tone   Skin: Normal color and turgor  Neurologic: A/Ox3, CN II-XII intact  Psychiatric: Appropriate mood and affect    LABS AND IMAGING:  PSA   Date Value Ref Range Status   09/20/2018 9.71 (H) 0 - 4 ug/L Final     Comment:     Assay Method:  Chemiluminescence using Siemens Vista analyzer   12/01/2017 6.99 (H) 0 - 4 ug/L Final     Comment:     Assay Method:  Chemiluminescence using Siemens Vista analyzer     PSA Tumor Marker   Date Value Ref Range Status   06/13/2024 0.81 0.00 - 4.50 ng/mL Final     Comment:     Results confirmed by repeat test.    07/21/2023 10.70 (H) 0.00 - 4.50 ng/mL Final   11/11/2022 11.70 (H) 0.00 - 4.00 ug/L Final         IMPRESSION:Patient is doing well from a acute toxicity perspective but having some intermittent blood clots per rectum.  PSA has responded, current value of 0.81.     PLAN:   Patient will RTC in 3 months with PSA  Rx for preparation H and discussed medical interventions. If this does not work, recommend scope evaluation. I will have our RN contact patient in 6 weeks via telephone to assess.     Yuniel Escobar M.D.  Department of Radiation Oncology  [facility]     A total of 30 minutes were spent on this visit, including preparation for this visit, face to face with patient, and medical  decision making. Medical decision-making included consideration and possible diagnosis, management options, complex record review, review of diagnostic tests, consideration and discussion of significant complications based up medical history/comorbidities, and discussion with providers involved in care of the patient.       Again, thank you for allowing me to participate in the care of your patient.        Sincerely,        Yuniel Escobar MD

## 2024-10-18 NOTE — NURSING NOTE
"Oncology Rooming Note    October 18, 2024 1:18 PM   Jose Alejandro Eason is a 68 year old male who presents for:    Chief Complaint   Patient presents with    Cancer     Radiation Oncology Return Visit with Dr. Yuniel Escobar     Initial Vitals: BP (!) 148/95 (BP Location: Left arm, Patient Position: Chair, Cuff Size: Adult Regular)   Pulse 61   Temp 97.8  F (36.6  C) (Oral)   Resp 16   Wt 88.7 kg (195 lb 8 oz)   SpO2 100%   BMI 29.29 kg/m   Estimated body mass index is 29.29 kg/m  as calculated from the following:    Height as of 6/13/19: 1.74 m (5' 8.5\").    Weight as of this encounter: 88.7 kg (195 lb 8 oz). Body surface area is 2.07 meters squared.  No Pain (0) Comment: Data Unavailable   No LMP for male patient.  Allergies reviewed: Yes  Medications reviewed: Yes    Medications: Medication refills not needed today.  Pharmacy name entered into [employer]:    [facility] [id number] [place] [address]/PHARMACY [address][id number] [place] [id number] [facility]    Frailty Screening:   Is the patient here for a new oncology consult visit in cancer care? 2. No      Clinical concerns: Patient here today for return radiation oncology visit, with concerns of blood in his stools for the last two week. Pt reports flecks of blood in stools, did have a stool today without blood. Reports intermittent mild pain near rectum when sitting. Denies blood in urine.  Dr. Yuniel Escobar was notified.      Magnus Stallings RN              "

## 2024-10-18 NOTE — PATIENT INSTRUCTIONS
Please contact [facility] RN with questions or concerns following today's appointment.    If you are a patient of Dr. Escobar call: [phone]   If you are a patient of Dr. Garcia call: [phone]    Please feel free to leave a detailed message if your call is not answered.    If your call is not received before 3:00 PM, it may not be returned until the following business day.    If you are receiving radiation treatment and need assistance after 3:00 PM or on the weekends, please call [phone] and ask to speak to the radiation oncologist on-call.    Thank you!    [facility] Radiation Oncology Nursing

## 2024-10-23 NOTE — PROGRESS NOTES
Department of Radiation Oncology  [facility]    Health: Cancer Center  [facility]  [address] [address] [place] [address] [phone]       Radiation Oncology Follow-up Visit  Oct 18, 2024      Jose Alejandro Easno  MRN: [id number]   : 1955     DIAGNOSIS / ID: Mr. Eason is a 69 year old male who was is on active surveillance for low risk prostate cancer ( cT1c,PSA 6.99, GS 3+3=6, ) now presenting with progression to unfavorable, intermediate risk prostate cancer (cT1c, PSA 11.7, GS 3+4=7 (3 cores positive with 1% of kt 4). Metastatic work up is negative for LAD or distant disease.      INTENT OF RADIOTHERAPY: Definitive     CONCURRENT SYSTEMIC THERAPY: ADT           SITE OF TREATMENT: prostate and proximal SV     DATES  OF TREATMENT: 2/15/24-3/25/24     TOTAL DOSE OF TREATMENT / FRACTIONS: 70Gy/28fx     INTERVAL SINCE COMPLETION OF RADIATION THERAPY: 6  months    SUBJECTIVE:   Patient was last seen a few week ago . He is now 6 months post radiation therapy.     Patient request to be seen earlier due to new symptoms. 1  month ago weeks ago, he had two episodes of blood clot per rectum, no pain, no interventions. This had self-resolved, and he is no longer having this. He did also endorse he feels it may be related to something he ate. This has since resolved.    He, however, is still having mild blood clots per rectum, not currently performing any intervention.  Denies any new GI/ symptoms and energy levels have returned. Of note, he plans to potentially relocate by to [facility] in the next year or two.    IPSS:     QoL: 0    ALTON:     PHYSICAL EXAM:  BP (!) 148/95 (BP Location: Left arm, Patient Position: Chair, Cuff Size: Adult Regular)   Pulse 61   Temp 97.8  F (36.6  C) (Oral)   Resp 16   Wt 88.7 kg (195 lb 8 oz)   SpO2 100%   BMI 29.29 kg/m    Gen: Alert, in NAD  Eyes: PERRL, EOMI, sclera anicteric  HENT     Head: NC/AT     Ears:  No external auricular lesions     Nose/sinus: No rhinorrhea or epistaxis     Oral Cavity/Oropharynx: MMM  Neck: Supple, full ROM, no LAD  Pulm: No wheezing, stridor or respiratory distress  CV: Well-perfused, no cyanosis, no pedal edema  Abdominal: Soft, nontender, nondistended, no hepatomegaly  Back: No step-offs or pain to palpation along the thoracolumbar spine, no CVA tenderness  Rectal/: Deferred  Musculoskeletal: Normal bulk and tone   Skin: Normal color and turgor  Neurologic: A/Ox3, CN II-XII intact  Psychiatric: Appropriate mood and affect    LABS AND IMAGING:  PSA   Date Value Ref Range Status   09/20/2018 9.71 (H) 0 - 4 ug/L Final     Comment:     Assay Method:  Chemiluminescence using Siemens Vista analyzer   12/01/2017 6.99 (H) 0 - 4 ug/L Final     Comment:     Assay Method:  Chemiluminescence using Siemens Vista analyzer     PSA Tumor Marker   Date Value Ref Range Status   06/13/2024 0.81 0.00 - 4.50 ng/mL Final     Comment:     Results confirmed by repeat test.    07/21/2023 10.70 (H) 0.00 - 4.50 ng/mL Final   11/11/2022 11.70 (H) 0.00 - 4.00 ug/L Final         IMPRESSION:Patient is doing well from a acute toxicity perspective but having some intermittent blood clots per rectum.  PSA has responded, current value of 0.81.     PLAN:   Patient will RTC in 3 months with PSA  Rx for preparation H and discussed medical interventions. If this does not work, recommend scope evaluation. I will have our RN contact patient in 6 weeks via telephone to assess.     Yuniel Escobar M.D.  Department of Radiation Oncology  [facility]     A total of 30 minutes were spent on this visit, including preparation for this visit, face to face with patient, and medical decision making. Medical decision-making included consideration and possible diagnosis, management options, complex record review, review of diagnostic tests, consideration and discussion of significant complications based up medical  history/comorbidities, and discussion with providers involved in care of the patient.

## 2024-12-30 ENCOUNTER — LAB (OUTPATIENT)
Dept: LAB | Facility: CLINIC | Age: 69
End: 2024-12-30
Payer: COMMERCIAL

## 2024-12-30 DIAGNOSIS — C61 PROSTATE CANCER (H): ICD-10-CM

## 2024-12-30 LAB — PSA SERPL DL<=0.01 NG/ML-MCNC: 0.59 NG/ML (ref 0–4.5)

## 2024-12-30 PROCEDURE — 84153 ASSAY OF PSA TOTAL: CPT

## 2024-12-30 PROCEDURE — 36415 COLL VENOUS BLD VENIPUNCTURE: CPT

## 2025-01-04 ENCOUNTER — HEALTH MAINTENANCE LETTER (OUTPATIENT)
Age: 70
End: 2025-01-04

## 2025-01-15 ENCOUNTER — OFFICE VISIT (OUTPATIENT)
Dept: RADIATION ONCOLOGY | Facility: CLINIC | Age: 70
End: 2025-01-15
Payer: COMMERCIAL

## 2025-01-15 VITALS
HEART RATE: 60 BPM | SYSTOLIC BLOOD PRESSURE: 153 MMHG | TEMPERATURE: 98.3 F | DIASTOLIC BLOOD PRESSURE: 94 MMHG | BODY MASS INDEX: 29.38 KG/M2 | WEIGHT: 196.1 LBS | RESPIRATION RATE: 16 BRPM | OXYGEN SATURATION: 99 %

## 2025-01-15 DIAGNOSIS — C61 PROSTATE CANCER (H): Primary | ICD-10-CM

## 2025-01-15 PROCEDURE — 99213 OFFICE O/P EST LOW 20 MIN: CPT | Performed by: RADIOLOGY

## 2025-01-15 PROCEDURE — G2211 COMPLEX E/M VISIT ADD ON: HCPCS | Performed by: RADIOLOGY

## 2025-01-15 ASSESSMENT — PAIN SCALES - GENERAL: PAINLEVEL_OUTOF10: NO PAIN (0)

## 2025-01-15 NOTE — NURSING NOTE
"Oncology Rooming Note    January 15, 2025 12:33 PM   Jose Alejandro Eason is a 69 year old male who presents for:    Chief Complaint   Patient presents with    Radiation Therapy     Return appointment with Dr. Garcia      Initial Vitals: BP (!) 153/94   Pulse 60   Temp 98.3  F (36.8  C) (Oral)   Resp 16   Wt 89 kg (196 lb 1.6 oz)   SpO2 99%   BMI 29.38 kg/m   Estimated body mass index is 29.38 kg/m  as calculated from the following:    Height as of 6/13/19: 1.74 m (5' 8.5\").    Weight as of this encounter: 89 kg (196 lb 1.6 oz). Body surface area is 2.07 meters squared.  No Pain (0) Comment: Data Unavailable   No LMP for male patient.  Allergies reviewed: Yes  Medications reviewed: Yes    Medications: Medication refills not needed today.  Pharmacy name entered into [employer]:    [facility] [address] [place] [facility] [address] [place] [address]  [id number] [place] [place] [facility] [address] [facility]  [id number] [place], [place] - [address]    Frailty Screening:   Is the patient here for a new oncology consult visit in cancer care? 2. No      Clinical concerns: Return appointment, review PSA. Patient reports no further blood per rectum, he has cancelled his colonoscopy for now. See AUA and ALTON flowsheets. Denies diarrhea or constipation, or issues with urination.  Dr. Garcia was notified.      Daisy Lara RN              "

## 2025-01-15 NOTE — PROGRESS NOTES
Dear Colleagues,  Today Jose Alejandro Eason was seen in follow up      IDENTIFICATION: Mr. Eason is a 68 year old male who was is on active surveillance for low risk prostate cancer ( cT1c,PSA 6.99, GS 3+3=6, 2018) now presenting with progression to favorable, intermediate risk prostate cancer (cT1c, PSA 11.7, GS 3+4=7 (3 cores positive with 1% of kt 4). Metastatic work up is negative for LAD or distant disease. He finished XRT to the prostate and SV on 3/25/24.    INTERVAL HISTORY:  Jose Alejandro Eason was last seen in our clinic by Dr. Escobar in October of 2024 w/ c/o blood per rectum. Since that time it resolved one month after his October visit. No longer needing anusol and he cancelled colonoscopy. He returns today in follow up and has no complaints.  AUA score is 3; frequency x 1, weak stream x 1, and nocturia x 1.  Specifically denies n/v/ha/sob/cp/new GI or  symptoms.    REVIEW OF SYSTEMS: As per HPI, a 14-point review of systems is otherwise negative.    Past Medical History:   Diagnosis Date    Essential hypertension with goal blood pressure less than 140/90 2011    Hyperlipidemia     Prostate cancer (H) 03/23/2018    S/P radiation therapy     7,000 cGy to prostate + SV completed on 3/25/2024 [facility]       Past Surgical History:   Procedure Laterality Date    PROSTATE BIOPSY  03/23/2018    PROSTATE BIOPSY  04/05/2022       Family History   Problem Relation Age of Onset    Ovarian Cancer Sister 70    Diabetes Daughter         type 1     Hypertension No family hx of     Coronary Artery Disease No family hx of     Cerebrovascular Disease No family hx of        Social History     Tobacco Use    Smoking status: Never    Smokeless tobacco: Never   Substance Use Topics    Alcohol use: No     Comment: stopped drinking in 2010       Current Outpatient Medications   Medication Sig Dispense Refill    atorvastatin (LIPITOR) 20 MG tablet Take 20 mg by mouth daily      IBUPROFEN PO Take 200 mg  by mouth every 4 hours as needed for moderate pain.      amLODIPine (NORVASC) 5 MG tablet Take 5 mg by mouth daily       No current facility-administered medications for this visit.          Allergies   Allergen Reactions    Sulfa Antibiotics Itching     Skin itches and then burns and blisters.         PHYSICAL EXAM:  BP (!) 153/94   Pulse 60   Temp 98.3  F (36.8  C) (Oral)   Resp 16   Wt 89 kg (196 lb 1.6 oz)   SpO2 99%   BMI 29.38 kg/m    GEN: appears well, in no acute distress  HEENT: normocephalic and atraumatic, EOMI, anicteric sclerae  CV: no LE edema, no JVD  RESP: normal respiration on room air, no stridor  SKIN: normal color and turgor  MSK: moving all extremities well  LYMPHATICS: no cervical or supraclavicular LAD  NEURO: no gross neurologic deficit  PSYCH: appropriate mood, affect, and judgment    All pertinent laboratory, imaging, and pathology findings have been reviewed.   PSA   Date Value Ref Range Status   09/20/2018 9.71 (H) 0 - 4 ug/L Final     Comment:     Assay Method:  Chemiluminescence using Siemens Vista analyzer   12/01/2017 6.99 (H) 0 - 4 ug/L Final     Comment:     Assay Method:  Chemiluminescence using Siemens Vista analyzer     PSA Tumor Marker   Date Value Ref Range Status   12/30/2024 0.59 0.00 - 4.50 ng/mL Final   06/13/2024 0.81 0.00 - 4.50 ng/mL Final     Comment:     Results confirmed by repeat test.    07/21/2023 10.70 (H) 0.00 - 4.50 ng/mL Final   11/11/2022 11.70 (H) 0.00 - 4.00 ug/L Final         IMPRESSION/RECOMMENDATION:  68 year old male who was is on active surveillance for low risk prostate cancer ( cT1c,PSA 6.99, GS 3+3=6, 2018) now presenting with progression to favorable, intermediate risk prostate cancer (cT1c, PSA 11.7, GS 3+4=7 (3 cores positive with 1% of kt 4). Metastatic work up is negative for LAD or distant disease. He finished XRT to the prostate and SV on 3/25/24. He is doing well with no significant radiation toxicity and downtrending PSA. He is  planning on going to [facility] for and extended amount of time.  We discussed that follow up with PSA checks should be at least Q6 months but can be done by me, urology or pcp or in [place] if he relocates permanently. I will schedule him for repeat PSA in march or April (1-2 weeks prior to [facility] trip) with phone call and then again 12 months after that.  In the interim he will reestablish care with Dr. Galindo so that he can have Q6 month PSA checks through his clinic.    Thank you for allowing me to participate in the care of this pleasant patient. If you have any questions, please do not hesitate to contact my office.    Karl Garcia MD  Attending Physician  Radiation Oncology

## 2025-01-15 NOTE — LETTER
1/15/2025      Jose Alejandro Eason  [address] [place] [address]      Dear Colleague,    Thank you for referring your patient, Jose Alejandro Eason, to the [facility] [place]. Please see a copy of my visit note below.    Dear Colleagues,  Today Jose Alejandro Eason was seen in follow up      IDENTIFICATION: Mr. Eason is a 68 year old male who was is on active surveillance for low risk prostate cancer ( cT1c,PSA 6.99, GS 3+3=6, 2018) now presenting with progression to favorable, intermediate risk prostate cancer (cT1c, PSA 11.7, GS 3+4=7 (3 cores positive with 1% of kt 4). Metastatic work up is negative for LAD or distant disease. He finished XRT to the prostate and SV on 3/25/24.    INTERVAL HISTORY:  Jose Alejandro Eason was last seen in our clinic by Dr. Escobar in October of 2024 w/ c/o blood per rectum. Since that time it resolved one month after his October visit. No longer needing anusol and he cancelled colonoscopy. He returns today in follow up and has no complaints. Specifically denies n/v/ha/sob/cp/new GI or  symptoms.    REVIEW OF SYSTEMS: As per HPI, a 14-point review of systems is otherwise negative.    Past Medical History:   Diagnosis Date     Essential hypertension with goal blood pressure less than 140/90 2011     Hyperlipidemia      Prostate cancer (H) 03/23/2018     S/P radiation therapy     7,000 cGy to prostate + SV completed on 3/25/2024 - [facility]       Past Surgical History:   Procedure Laterality Date     PROSTATE BIOPSY  03/23/2018     PROSTATE BIOPSY  04/05/2022       Family History   Problem Relation Age of Onset     Ovarian Cancer Sister 70     Diabetes Daughter         type 1      Hypertension No family hx of      Coronary Artery Disease No family hx of      Cerebrovascular Disease No family hx of        Social History     Tobacco Use     Smoking status: Never     Smokeless tobacco: Never   Substance Use Topics     Alcohol use: No      Pt would like to know if she should keep the ring worm rash covered or exposed to air.   Please call pt @ (Phone: [phone] )back to inform her on care instructions.  Thank you   Comment: stopped drinking in 2010       Current Outpatient Medications   Medication Sig Dispense Refill     atorvastatin (LIPITOR) 20 MG tablet Take 20 mg by mouth daily       IBUPROFEN PO Take 200 mg by mouth every 4 hours as needed for moderate pain.       amLODIPine (NORVASC) 5 MG tablet Take 5 mg by mouth daily       No current facility-administered medications for this visit.          Allergies   Allergen Reactions     Sulfa Antibiotics Itching     Skin itches and then burns and blisters.         PHYSICAL EXAM:  There were no vitals taken for this visit.    GEN: appears well, in no acute distress  HEENT: normocephalic and atraumatic, EOMI, anicteric sclerae  CV: no LE edema, no JVD  RESP: normal respiration on room air, no stridor  ABDOMEN: soft, NT, ND  SKIN: normal color and turgor  MSK: moving all extremities well  LYMPHATICS: no cervical or supraclavicular LAD  NEURO: CN II-XII grossly intact, no focal neurologic deficit  PSYCH: appropriate mood, affect, and judgment    All pertinent laboratory, imaging, and pathology findings have been reviewed.   PSA   Date Value Ref Range Status   09/20/2018 9.71 (H) 0 - 4 ug/L Final     Comment:     Assay Method:  Chemiluminescence using Siemens Vista analyzer   12/01/2017 6.99 (H) 0 - 4 ug/L Final     Comment:     Assay Method:  Chemiluminescence using Siemens Vista analyzer     PSA Tumor Marker   Date Value Ref Range Status   12/30/2024 0.59 0.00 - 4.50 ng/mL Final   06/13/2024 0.81 0.00 - 4.50 ng/mL Final     Comment:     Results confirmed by repeat test.    07/21/2023 10.70 (H) 0.00 - 4.50 ng/mL Final   11/11/2022 11.70 (H) 0.00 - 4.00 ug/L Final         IMPRESSION/RECOMMENDATION:  68 year old male who was is on active surveillance for low risk prostate cancer ( cT1c,PSA 6.99, GS 3+3=6, 2018) now presenting with progression to favorable, intermediate risk prostate cancer (cT1c, PSA 11.7, GS 3+4=7 (3 cores positive with 1% of kt 4). Metastatic work up is  negative for LAD or distant disease. He finished XRT to the prostate and SV on 3/25/24. He is doing well with no significant radiation toxicity and downtrending PSA. He is planning on going to [facility] for and extended amount of time.  We discussed that follow up with PSA checks should be at least Q6 months but can be done by me, urology or pcp.  He will reestablish care with Dr. Galindo.  In the interim I will schedule him for repeat PSA (1-2 weeks prior to [facility] trip) with phone call and then again 12 months after that.  In the interim he will reestablish care with Dr. Galindo so that he can resume Q 6 month PSA checks through his clinic.    Thank you for allowing me to participate in the care of this pleasant patient. If you have any questions, please do not hesitate to contact my office.    Karl Garcia MD  Attending Physician  Radiation Oncology      Again, thank you for allowing me to participate in the care of your patient.        Sincerely,        CLAIRE Garcia MD    Electronically signed

## 2025-03-24 ENCOUNTER — TELEPHONE (OUTPATIENT)
Dept: RADIATION ONCOLOGY | Facility: CLINIC | Age: 70
End: 2025-03-24

## 2025-03-24 NOTE — TELEPHONE ENCOUNTER
Jose Alejandro called and called his upcoming PSA and return visit with Dr. Garcia and rescheduled his PSA to June and said he will now just start seeing Dr. Galindo and no longer need to see Dr. Garcia.

## 2025-06-09 ENCOUNTER — LAB (OUTPATIENT)
Dept: LAB | Facility: CLINIC | Age: 70
End: 2025-06-09

## 2025-06-09 DIAGNOSIS — C61 PROSTATE CANCER (H): ICD-10-CM

## 2025-06-09 LAB — PSA SERPL DL<=0.01 NG/ML-MCNC: 0.84 NG/ML (ref 0–4.5)

## 2025-06-09 PROCEDURE — 84153 ASSAY OF PSA TOTAL: CPT

## 2025-06-09 PROCEDURE — 36415 COLL VENOUS BLD VENIPUNCTURE: CPT
